# Patient Record
Sex: FEMALE | Race: WHITE | URBAN - METROPOLITAN AREA
[De-identification: names, ages, dates, MRNs, and addresses within clinical notes are randomized per-mention and may not be internally consistent; named-entity substitution may affect disease eponyms.]

---

## 2024-07-22 ENCOUNTER — INITIAL PRENATAL (OUTPATIENT)
Age: 29
End: 2024-07-22

## 2024-07-22 VITALS
HEIGHT: 63 IN | WEIGHT: 161 LBS | DIASTOLIC BLOOD PRESSURE: 60 MMHG | BODY MASS INDEX: 28.53 KG/M2 | SYSTOLIC BLOOD PRESSURE: 90 MMHG

## 2024-07-22 DIAGNOSIS — Z34.90 PREGNANCY, UNSPECIFIED GESTATIONAL AGE: Primary | ICD-10-CM

## 2024-07-22 PROCEDURE — 0500F INITIAL PRENATAL CARE VISIT: CPT | Performed by: OBSTETRICS & GYNECOLOGY

## 2024-07-22 RX ORDER — MAGNESIUM GLUCONATE 27 MG(500)
500 TABLET ORAL 2 TIMES DAILY
COMMUNITY

## 2024-07-22 NOTE — PROGRESS NOTES
Initial Obstetric Visit    Current pregnancy history:    Ralph Miranda is a  28 y.o. female Black /   White (non-) Patient's last menstrual period was 05/24/2024..    She presents for the evaluation of amenorrhea and a positive pregnancy test.    LMP history:  The date of her LMP is unsure.    Her last menstrual period was not normal and her LMP is not certain.         Ultrasound data:  She had an ultrasound performed today in the office which revealed a viable zuluaga pregnancy.  See ultrasound report for details.        TA ULTRASOUND PERFORMED A SINGLE VIABLE 6W3D WITH CINDY OF 03/14/2025 IUP IS SEEN WITH NORMAL CARDIAC RHYTHM. GESTATIONAL AGE BASED ON TODAYS ULTRASOUND. A NORMAL YOLK SAC IS SEEN. RIGHT OVARY APPEARS WITHIN NORMAL LIMITS. LEFT OVARY APPEARS WITHIN NORMAL LIMITS. NO FREE FLUID IS SEEN IN THE CDS.       Her final estimated due date is 3/14/25.  CINDY is derived from her LMP, which is concordant with ultrasound dating.      Pregnancy symptoms:  Since her LMP she has experienced nausea, dyspnea. Jolly drops helping  She denies dysuria, discharge, vaginal bleeding.      Past pregnancy history:  G1 - SAB (chemical pregnancy) in April     Relevant medical problems:  Anxiety and depression - not on meds currently    Social History:  Manager at [employer]  , Jv,  at [employer]     _ _ _ _ _ _ _ _ _ _ _ _ _ _ _ _ _ _ _ _ _ _ _ _ _ _ _ _ _ _ _ _     Substance history: Since first positive pregnancy test; negative for alcohol, tobacco and street drugs.             Exposure history:   There is/are no outdoor cat/s in the home. She has an indoor cat.  If yes, the patient was instructed to not change the cat litter.   She admits close contact with children on a regular basis.   She has had chicken pox or the vaccine in the past.   Patient denies issues with domestic violence.     Genetic Screening/Teratology Counseling: (Includes patient, baby's father, or anyone in

## 2024-07-22 NOTE — PROGRESS NOTES
Initial Prenatal Note    CC: Amenorrhea, positive pregnancy test    HPI:  Ralph Miranda is a 28 y.o.. Who presents for initial prenatal appointment. Since her LMP she has experienced nausea has treated with lemon brayden cough drop. She denies dysuria, discharge, vaginal bleeding.    Wants to do genetic screening, wants to know gender    Wants to do a water birth, does not want epidural    LMP history:  The date of her LMP 5/23/2024 full normal in June she had one on the 5th, not normal. Her last menstrual period was not normal.    Based on her LMP, her EDC is 2/27/2025 and her GA 8w4d     Ultrasound data:  She had an  ultrasound done by the ultrasound tech today which revealed a viable zuluaga pregnancy with a gestational age of 6w3d and an CINDY of 3/14/2025      TA ULTRASOUND PERFORMED A SINGLE VIABLE 6W3D WITH CINDY OF 03/14/2025 IUP IS SEEN WITH NORMAL CARDIAC RHYTHM. GESTATIONAL AGE BASED ON TODAYS ULTRASOUND. A NORMAL YOLK SAC IS SEEN. RIGHT OVARY APPEARS WITHIN NORMAL LIMITS. LEFT OVARY APPEARS WITHIN NORMAL LIMITS. NO FREE FLUID IS SEEN IN THE CDS.      Relevant past pregnancy history:  She has the following pregnancy history:  miscarriage 2 months ago    Relevant past medical history:   Last Pap: 5 years ago, normal per patient     Relevant social history:  Her occupation is:   for [employer]  Her partner's name and occupation is: Jv, manager at [employer]    1. Have you been to the ER, urgent care clinic, or hospitalized since your last visit? no    2. Have you seen or consulted any other health care providers outside of the [facility] since your last visit? no    She declines a chaperone during the gynecologic exam today.      Quin Villalpando LPN

## 2024-08-07 ENCOUNTER — ROUTINE PRENATAL (OUTPATIENT)
Age: 29
End: 2024-08-07

## 2024-08-07 VITALS — WEIGHT: 165 LBS | BODY MASS INDEX: 29.23 KG/M2 | DIASTOLIC BLOOD PRESSURE: 62 MMHG | SYSTOLIC BLOOD PRESSURE: 100 MMHG

## 2024-08-07 DIAGNOSIS — Z34.90 PREGNANCY, UNSPECIFIED GESTATIONAL AGE: Primary | ICD-10-CM

## 2024-08-07 PROCEDURE — 0502F SUBSEQUENT PRENATAL CARE: CPT | Performed by: OBSTETRICS & GYNECOLOGY

## 2024-08-07 NOTE — PROGRESS NOTES
Routine prenatal visit today.  Ultrasound images reviewed with the couple. Reassured re viable IUP c/w prior dating.  Nausea has worsened, dry heaving.  Taking brayden, frequent snacking.    Plan on initial OB labs next visit with possible NIPT/carrier screening - talking with rep about coverage.    She notes her grandmother had a stillbirth from her body attacking the baby, relays the team recommended a blood transfusion but her grandmother refused.  Pt thinks she is O positive. Reassured re the availability of rhogam now and antibody screening.

## 2024-08-07 NOTE — PROGRESS NOTES
The ultrasound images and findings from today were reviewed with the patient.  See ultrasound report and progress note for details.       Natasha Aldana MD

## 2024-08-08 LAB
BACTERIA SPEC CULT: NORMAL
SERVICE CMNT-IMP: NORMAL

## 2024-08-10 LAB
C TRACH RRNA SPEC QL NAA+PROBE: NEGATIVE
N GONORRHOEA RRNA SPEC QL NAA+PROBE: NEGATIVE
SPECIMEN SOURCE: NORMAL
T VAGINALIS RRNA SPEC QL NAA+PROBE: NEGATIVE

## 2024-08-28 ENCOUNTER — LAB (OUTPATIENT)
Age: 29
End: 2024-08-28

## 2024-08-28 DIAGNOSIS — Z34.90 PREGNANCY, UNSPECIFIED GESTATIONAL AGE: Primary | ICD-10-CM

## 2024-08-28 LAB
ABO + RH BLD: NORMAL
BLOOD BANK CMNT PATIENT-IMP: NORMAL
BLOOD GROUP ANTIBODIES SERPL: NORMAL
ERYTHROCYTE [DISTWIDTH] IN BLOOD BY AUTOMATED COUNT: 12.8 % (ref 11.5–14.5)
FERRITIN SERPL-MCNC: 62 NG/ML (ref 8–252)
HBV SURFACE AG SER QL: <0.1 INDEX
HBV SURFACE AG SER QL: NEGATIVE
HCT VFR BLD AUTO: 36.8 % (ref 35–47)
HCV AB SER IA-ACNC: 0.05 INDEX
HCV AB SERPL QL IA: NONREACTIVE
HEP B, EXTERNAL RESULT: NEGATIVE
HGB BLD-MCNC: 12 G/DL (ref 11.5–16)
HIV 1+2 AB+HIV1 P24 AG SERPL QL IA: NONREACTIVE
HIV 1/2 RESULT COMMENT: NORMAL
HIV, EXTERNAL RESULT: NONREACTIVE
MCH RBC QN AUTO: 30.8 PG (ref 26–34)
MCHC RBC AUTO-ENTMCNC: 32.6 G/DL (ref 30–36.5)
MCV RBC AUTO: 94.6 FL (ref 80–99)
NRBC # BLD: 0 K/UL (ref 0–0.01)
NRBC BLD-RTO: 0 PER 100 WBC
PLATELET # BLD AUTO: 202 K/UL (ref 150–400)
PMV BLD AUTO: 11.5 FL (ref 8.9–12.9)
RBC # BLD AUTO: 3.89 M/UL (ref 3.8–5.2)
RUBELLA TITER, EXTERNAL RESULT: NORMAL
RUBV IGG SERPL IA-ACNC: NORMAL IU/ML
SPECIMEN EXP DATE BLD: NORMAL
T. PALLIDUM (SYPHILIS) ANTIBODY, EXTERNAL RESULT: NONREACTIVE
WBC # BLD AUTO: 5.2 K/UL (ref 3.6–11)

## 2024-08-29 LAB
T PALLIDUM AB SER QL IA: NON REACTIVE
VZV IGG SER IA-ACNC: <135 INDEX

## 2024-09-03 LAB
HGB A MFR BLD: 96.4 % (ref 96.4–98.8)
HGB A2 MFR BLD COLUMN CHROM: 2.8 % (ref 1.8–3.2)
HGB F MFR BLD: 0.8 % (ref 0–2)
HGB FRACT BLD-IMP: NORMAL
HGB S MFR BLD: 0 %

## 2024-09-05 ENCOUNTER — ROUTINE PRENATAL (OUTPATIENT)
Age: 29
End: 2024-09-05

## 2024-09-05 VITALS
BODY MASS INDEX: 29.59 KG/M2 | TEMPERATURE: 97.9 F | SYSTOLIC BLOOD PRESSURE: 129 MMHG | HEIGHT: 63 IN | OXYGEN SATURATION: 98 % | WEIGHT: 167 LBS | DIASTOLIC BLOOD PRESSURE: 79 MMHG | RESPIRATION RATE: 12 BRPM | HEART RATE: 86 BPM

## 2024-09-05 DIAGNOSIS — Z34.90 PREGNANCY, UNSPECIFIED GESTATIONAL AGE: Primary | ICD-10-CM

## 2024-09-05 PROCEDURE — 0502F SUBSEQUENT PRENATAL CARE: CPT | Performed by: OBSTETRICS & GYNECOLOGY

## 2024-09-05 NOTE — PROGRESS NOTES
Patient here for return OB visit at 12w6d. She reports no concerns.    She denies vaginal bleeding, loss of fluid, abnormal vaginal discharge, UTI symptoms, cramping, or contractions.       /79 (Site: Right Upper Arm, Position: Sitting)   Pulse 86   Temp 97.9 °F (36.6 °C) (Oral)   Resp 12   Ht 1.6 m (5' 3\")   Wt 75.8 kg (167 lb)   LMP 05/24/2024   SpO2 98%   BMI 29.58 kg/m²            Patient Active Problem List    Diagnosis Date Noted    Pregnancy 07/22/2024     Primary Provider: Katya          EDC by 6 wk     Pregnancy problems:  Anxiety and depression:  not on meds currently, stable    IOB labs:  O pos, normal, VZV NI--> PP vacc,  GC/Chl neg, urine cult neg  Genetic Screening: NIPT ___ and carrier screening ____  Anatomy:  Flu:  TDAP:  Third Tri Labs:  GBS:    Pain mgmt. in labor:  Feeding:  Circ:  Social: patient is a  at [employer] and  Jv is a  at [employer]        Depression with anxiety 12/23/2014       Return in about 4 weeks (around 10/3/2024).    Melvi Gordon MD

## 2024-09-05 NOTE — PROGRESS NOTES
Ralph Miranda is 12w6d   Doing well  Denies LOF, bleeding/spotting, headache, blurred vision, edema, or RUQ pain.   Occasional discomfort and cramping when laying in bed. Occasional gums and nose bleeding.

## 2024-09-06 LAB
Lab: NEGATIVE
Lab: NORMAL
NTRA CYSTIC FIBROSIS: NEGATIVE
NTRA DUCHENNE/BECKER MUSCULAR DYSTROPHY: NEGATIVE
NTRA FRAGILE X SYNDROME: NEGATIVE
NTRA SPINAL MUSCULAR ATROPHY: NEGATIVE

## 2024-09-16 ENCOUNTER — TELEPHONE (OUTPATIENT)
Age: 29
End: 2024-09-16

## 2024-09-16 DIAGNOSIS — Z34.90 PREGNANCY, UNSPECIFIED GESTATIONAL AGE: Primary | ICD-10-CM

## 2024-10-01 ENCOUNTER — ROUTINE PRENATAL (OUTPATIENT)
Age: 29
End: 2024-10-01

## 2024-10-01 VITALS
HEIGHT: 63 IN | HEART RATE: 83 BPM | TEMPERATURE: 99 F | SYSTOLIC BLOOD PRESSURE: 117 MMHG | OXYGEN SATURATION: 99 % | WEIGHT: 169 LBS | DIASTOLIC BLOOD PRESSURE: 76 MMHG | BODY MASS INDEX: 29.95 KG/M2 | RESPIRATION RATE: 18 BRPM

## 2024-10-01 DIAGNOSIS — Z34.90 PREGNANCY, UNSPECIFIED GESTATIONAL AGE: Primary | ICD-10-CM

## 2024-10-01 PROCEDURE — 0502F SUBSEQUENT PRENATAL CARE: CPT | Performed by: OBSTETRICS & GYNECOLOGY

## 2024-10-01 NOTE — PROGRESS NOTES
Routine prenatal visit today.  Leola flutters earlier this week!  Declines MSAFP today.  Fetal scan at her next appt.

## 2024-10-29 ENCOUNTER — ROUTINE PRENATAL (OUTPATIENT)
Age: 29
End: 2024-10-29

## 2024-10-29 ENCOUNTER — ROUTINE PRENATAL (OUTPATIENT)
Age: 29
End: 2024-10-29
Payer: COMMERCIAL

## 2024-10-29 VITALS — SYSTOLIC BLOOD PRESSURE: 124 MMHG | DIASTOLIC BLOOD PRESSURE: 74 MMHG | HEART RATE: 90 BPM

## 2024-10-29 VITALS
DIASTOLIC BLOOD PRESSURE: 76 MMHG | SYSTOLIC BLOOD PRESSURE: 115 MMHG | OXYGEN SATURATION: 97 % | RESPIRATION RATE: 16 BRPM | BODY MASS INDEX: 30.65 KG/M2 | WEIGHT: 173 LBS | HEIGHT: 63 IN | TEMPERATURE: 98.9 F | HEART RATE: 81 BPM

## 2024-10-29 DIAGNOSIS — Z82.79 FAMILY HISTORY OF CONGENITAL ANOMALIES: Primary | ICD-10-CM

## 2024-10-29 DIAGNOSIS — Z34.90 PREGNANCY, UNSPECIFIED GESTATIONAL AGE: Primary | ICD-10-CM

## 2024-10-29 DIAGNOSIS — Z3A.20 20 WEEKS GESTATION OF PREGNANCY: ICD-10-CM

## 2024-10-29 PROCEDURE — 0502F SUBSEQUENT PRENATAL CARE: CPT | Performed by: OBSTETRICS & GYNECOLOGY

## 2024-10-29 PROCEDURE — 99202 OFFICE O/P NEW SF 15 MIN: CPT | Performed by: OBSTETRICS & GYNECOLOGY

## 2024-10-29 PROCEDURE — 76805 OB US >/= 14 WKS SNGL FETUS: CPT | Performed by: OBSTETRICS & GYNECOLOGY

## 2024-10-29 NOTE — PROCEDURES
PATIENT: MEHRAN LUZ   -  : 1995   -  DOS:10/29/2024   -  INTERPRETING PROVIDER:Rick Robin,   Indication  ========     screening. FOB with history of possible ureterocele requiring surgery in his 20s.    Method  ======    Transabdominal ultrasound examination. View: Good view    Dating  ======    LMP on: 2024  GA by LMP 22 w + 4 d  CINDY by LMP: 2025  Previous Ultrasound on: 2024  Type of prior assessment: GA  GA at prior assessment date 6 w + 3 d  GA by previous U/S 20 w + 4 d  CINDY by previous Ultrasound: 3/14/2025  Ultrasound examination on: 10/29/2024  GA by U/S based upon: AC, BPD, Femur, HC  GA by U/S 21 w + 1 d  CINDY by U/S: 3/10/2025  Assigned: based on ultrasound (GA), selected on 10/29/2024  Assigned GA 20 w + 4 d  Assigned CINDY: 3/14/2025    Fetal Growth Overview  =================    Exam date        GA              BPD (mm)          HC (mm)              AC (mm)               FL (mm)             HL (mm)            EFW (g)  10/29/2024        20w 4d        49.1     61%        188.2    67%        164.9     75%        34.9    57%        31.1     40%        411    80%    Fetal Biometry  ============    Standard  BPD 49.1 mm 20w 6d 61% Hadlock  OFD 67.8 mm 22w 5d 98% Margareth  .2 mm 21w 1d 67% Hadlock  Cerebellum tr 22.3 mm  88% Verburg  Nuchal fold 2.5 mm  .9 mm 21w 4d 75% Hadlock  Femur 34.9 mm 21w 0d 57% Hadlock  Humerus 31.1 mm 20w 2d 40% Margareth   g 21w 1d 80% Hadlock  EFW (lb) 0 lb  EFW (oz) 14 oz  EFW by: Hadlock (BPD-HC-AC-FL)  Extended   5.0 mm  CM 6.9 mm  92% Nicolaides  Nasal bone 7.4 mm  Head / Face / Neck  Nasal bone: present  Other Structures   bpm    General Evaluation  ==============    Cardiac activity present.  bpm. Fetal movements: visualized. Presentation: Cephalic  Placenta: Placental site: anterior, appropriate distance from the internal os. Placental edge-to-cervical os distance 4.5 cm  Umbilical cord: Cord vessels: 3

## 2024-10-29 NOTE — PROGRESS NOTES
Doing well  Starting to feel baby move  Denies lof and vb  Declines flu shot  FAS with MFM this morning

## 2024-10-29 NOTE — PROGRESS NOTES
Routine prenatal visit today.  Feeling fetal flutter movements now!  Was able to d/c unisom, nausea is improving.   Declines flu shot today.   MFM fetal scan earlier today - report normal. They recommended growth scan in 8 weeks.

## 2024-12-04 ENCOUNTER — ROUTINE PRENATAL (OUTPATIENT)
Age: 29
End: 2024-12-04

## 2024-12-04 VITALS
RESPIRATION RATE: 20 BRPM | OXYGEN SATURATION: 98 % | HEIGHT: 63 IN | TEMPERATURE: 98 F | HEART RATE: 94 BPM | DIASTOLIC BLOOD PRESSURE: 67 MMHG | BODY MASS INDEX: 32.25 KG/M2 | WEIGHT: 182 LBS | SYSTOLIC BLOOD PRESSURE: 103 MMHG

## 2024-12-04 DIAGNOSIS — Z34.90 PREGNANCY, UNSPECIFIED GESTATIONAL AGE: Primary | ICD-10-CM

## 2024-12-04 PROCEDURE — 0502F SUBSEQUENT PRENATAL CARE: CPT | Performed by: OBSTETRICS & GYNECOLOGY

## 2024-12-04 NOTE — PROGRESS NOTES
Routine prenatal visit today.  Normal fetal movements.  Feeling more constipated - discussed.   Glucola with third tri labs, tdap, and growth US at next visit.

## 2024-12-04 NOTE — PROGRESS NOTES
Doing well but relays that she has been stressed as she lost her job. She started a part time at [employer], denies lof, vb and contractions

## 2024-12-20 DIAGNOSIS — Z34.90 PREGNANCY, UNSPECIFIED GESTATIONAL AGE: Primary | ICD-10-CM

## 2024-12-24 ENCOUNTER — ROUTINE PRENATAL (OUTPATIENT)
Age: 29
End: 2024-12-24

## 2024-12-24 VITALS
SYSTOLIC BLOOD PRESSURE: 100 MMHG | HEIGHT: 63 IN | WEIGHT: 187 LBS | TEMPERATURE: 98.6 F | DIASTOLIC BLOOD PRESSURE: 66 MMHG | RESPIRATION RATE: 16 BRPM | OXYGEN SATURATION: 97 % | BODY MASS INDEX: 33.13 KG/M2 | HEART RATE: 92 BPM

## 2024-12-24 DIAGNOSIS — Z34.90 PREGNANCY, UNSPECIFIED GESTATIONAL AGE: Primary | ICD-10-CM

## 2024-12-24 LAB
BLOOD BANK CMNT PATIENT-IMP: NORMAL
BLOOD GROUP ANTIBODIES SERPL: NORMAL
ERYTHROCYTE [DISTWIDTH] IN BLOOD BY AUTOMATED COUNT: 13.2 % (ref 11.5–14.5)
FERRITIN SERPL-MCNC: 24 NG/ML (ref 8–252)
GLUCOSE 1H P 100 G GLC PO SERPL-MCNC: 146 MG/DL (ref 65–140)
HCT VFR BLD AUTO: 35.5 % (ref 35–47)
HGB BLD-MCNC: 11.8 G/DL (ref 11.5–16)
MCH RBC QN AUTO: 31.3 PG (ref 26–34)
MCHC RBC AUTO-ENTMCNC: 33.2 G/DL (ref 30–36.5)
MCV RBC AUTO: 94.2 FL (ref 80–99)
NRBC # BLD: 0 K/UL (ref 0–0.01)
NRBC BLD-RTO: 0 PER 100 WBC
PLATELET # BLD AUTO: 196 K/UL (ref 150–400)
PMV BLD AUTO: 11.5 FL (ref 8.9–12.9)
RBC # BLD AUTO: 3.77 M/UL (ref 3.8–5.2)
WBC # BLD AUTO: 8.8 K/UL (ref 3.6–11)

## 2024-12-24 PROCEDURE — 0502F SUBSEQUENT PRENATAL CARE: CPT | Performed by: OBSTETRICS & GYNECOLOGY

## 2024-12-24 NOTE — PROGRESS NOTES
Routine prenatal visit today.  Normal fetal movements.  Fetal growth and DOROTHY normal today. Right lateral ventricle mildly dilated 1.1cm, recommend FU with MFM in 2-3 weeks, but reassured at this level the recommendation would be surveillance.   Glucola and third tri labs today.  Opts for tdap at next visit.

## 2024-12-24 NOTE — PROGRESS NOTES
Doing well, has had some bhc  Some heart burn, treating with tums when needed    Active FM  Declines lof and vb

## 2024-12-26 LAB — T PALLIDUM AB SER QL IA: NON REACTIVE

## 2024-12-30 ENCOUNTER — LAB (OUTPATIENT)
Age: 29
End: 2024-12-30

## 2024-12-30 DIAGNOSIS — Z34.90 PREGNANCY, UNSPECIFIED GESTATIONAL AGE: Primary | ICD-10-CM

## 2024-12-30 LAB
GESTATIONAL 3HR GTT: ABNORMAL
GLUCOSE 1H P 100 G GLC PO SERPL-MCNC: 147 MG/DL (ref 65–180)
GLUCOSE 2 HOUR: 119 MG/DL (ref 65–155)
GLUCOSE P FAST SERPL-MCNC: 97 MG/DL (ref 65–95)
GLUCOSE, 3 HOUR: 113 MG/DL (ref 65–140)

## 2025-01-10 ENCOUNTER — ROUTINE PRENATAL (OUTPATIENT)
Age: 30
End: 2025-01-10
Payer: MEDICAID

## 2025-01-10 VITALS — DIASTOLIC BLOOD PRESSURE: 69 MMHG | HEART RATE: 82 BPM | OXYGEN SATURATION: 96 % | SYSTOLIC BLOOD PRESSURE: 111 MMHG

## 2025-01-10 DIAGNOSIS — Z3A.31 31 WEEKS GESTATION OF PREGNANCY: Primary | ICD-10-CM

## 2025-01-10 PROCEDURE — 99203 OFFICE O/P NEW LOW 30 MIN: CPT | Performed by: OBSTETRICS & GYNECOLOGY

## 2025-01-10 RX ORDER — MULTIVIT WITH MINERALS/LUTEIN
1000 TABLET ORAL DAILY
COMMUNITY

## 2025-01-14 ENCOUNTER — HOSPITAL ENCOUNTER (OUTPATIENT)
Facility: HOSPITAL | Age: 30
Setting detail: OBSERVATION
Discharge: HOME | End: 2025-01-14
Attending: OBSTETRICS & GYNECOLOGY | Admitting: OBSTETRICS & GYNECOLOGY
Payer: MEDICAID

## 2025-01-14 ENCOUNTER — HOSPITAL ENCOUNTER (EMERGENCY)
Facility: HOSPITAL | Age: 30
Discharge: HOME | End: 2025-01-14
Attending: EMERGENCY MEDICINE
Payer: MEDICAID

## 2025-01-14 ENCOUNTER — ROUTINE PRENATAL (OUTPATIENT)
Age: 30
End: 2025-01-14
Payer: MEDICAID

## 2025-01-14 ENCOUNTER — APPOINTMENT (OUTPATIENT)
Facility: HOSPITAL | Age: 30
End: 2025-01-14
Payer: MEDICAID

## 2025-01-14 VITALS
TEMPERATURE: 98.3 F | SYSTOLIC BLOOD PRESSURE: 105 MMHG | DIASTOLIC BLOOD PRESSURE: 66 MMHG | WEIGHT: 194 LBS | HEART RATE: 94 BPM | HEIGHT: 63 IN | RESPIRATION RATE: 20 BRPM | BODY MASS INDEX: 34.38 KG/M2 | OXYGEN SATURATION: 97 %

## 2025-01-14 VITALS
OXYGEN SATURATION: 98 % | HEIGHT: 63 IN | DIASTOLIC BLOOD PRESSURE: 65 MMHG | TEMPERATURE: 98 F | SYSTOLIC BLOOD PRESSURE: 122 MMHG | HEART RATE: 80 BPM | WEIGHT: 194 LBS | BODY MASS INDEX: 34.38 KG/M2 | RESPIRATION RATE: 16 BRPM

## 2025-01-14 VITALS
BODY MASS INDEX: 34.53 KG/M2 | TEMPERATURE: 97.3 F | DIASTOLIC BLOOD PRESSURE: 76 MMHG | RESPIRATION RATE: 28 BRPM | SYSTOLIC BLOOD PRESSURE: 123 MMHG | HEIGHT: 63 IN | HEART RATE: 87 BPM | WEIGHT: 194.89 LBS | OXYGEN SATURATION: 96 %

## 2025-01-14 DIAGNOSIS — R10.9 ABDOMINAL PAIN, UNSPECIFIED ABDOMINAL LOCATION: ICD-10-CM

## 2025-01-14 DIAGNOSIS — R10.13 ABDOMINAL PAIN, EPIGASTRIC: Primary | ICD-10-CM

## 2025-01-14 DIAGNOSIS — Z34.90 PREGNANCY, UNSPECIFIED GESTATIONAL AGE: Primary | ICD-10-CM

## 2025-01-14 LAB
ALBUMIN SERPL-MCNC: 2.7 G/DL (ref 3.5–5)
ALBUMIN SERPL-MCNC: 2.8 G/DL (ref 3.5–5)
ALBUMIN/GLOB SERPL: 0.7 (ref 1.1–2.2)
ALBUMIN/GLOB SERPL: 0.7 (ref 1.1–2.2)
ALP SERPL-CCNC: 92 U/L (ref 45–117)
ALP SERPL-CCNC: 92 U/L (ref 45–117)
ALT SERPL-CCNC: 32 U/L (ref 12–78)
ALT SERPL-CCNC: 36 U/L (ref 12–78)
ANION GAP SERPL CALC-SCNC: 4 MMOL/L (ref 2–12)
ANION GAP SERPL CALC-SCNC: 6 MMOL/L (ref 2–12)
APPEARANCE UR: ABNORMAL
AST SERPL-CCNC: 15 U/L (ref 15–37)
AST SERPL-CCNC: ABNORMAL U/L (ref 15–37)
BACTERIA URNS QL MICRO: ABNORMAL /HPF
BASOPHILS # BLD: 0.01 K/UL (ref 0–0.1)
BASOPHILS # BLD: 0.02 K/UL (ref 0–0.1)
BASOPHILS NFR BLD: 0.1 % (ref 0–1)
BASOPHILS NFR BLD: 0.2 % (ref 0–1)
BILIRUB SERPL-MCNC: 0.2 MG/DL (ref 0.2–1)
BILIRUB SERPL-MCNC: 0.4 MG/DL (ref 0.2–1)
BILIRUB UR QL: NEGATIVE
BUN SERPL-MCNC: 6 MG/DL (ref 6–20)
BUN SERPL-MCNC: 7 MG/DL (ref 6–20)
BUN/CREAT SERPL: 11 (ref 12–20)
BUN/CREAT SERPL: 13 (ref 12–20)
CALCIUM SERPL-MCNC: 9.3 MG/DL (ref 8.5–10.1)
CALCIUM SERPL-MCNC: 9.3 MG/DL (ref 8.5–10.1)
CHLORIDE SERPL-SCNC: 108 MMOL/L (ref 97–108)
CHLORIDE SERPL-SCNC: 109 MMOL/L (ref 97–108)
CO2 SERPL-SCNC: 21 MMOL/L (ref 21–32)
CO2 SERPL-SCNC: 23 MMOL/L (ref 21–32)
COLOR UR: ABNORMAL
COMMENT:: NORMAL
CREAT SERPL-MCNC: 0.45 MG/DL (ref 0.55–1.02)
CREAT SERPL-MCNC: 0.61 MG/DL (ref 0.55–1.02)
DIFFERENTIAL METHOD BLD: ABNORMAL
DIFFERENTIAL METHOD BLD: ABNORMAL
EOSINOPHIL # BLD: 0.05 K/UL (ref 0–0.4)
EOSINOPHIL # BLD: 0.07 K/UL (ref 0–0.4)
EOSINOPHIL NFR BLD: 0.5 % (ref 0–7)
EOSINOPHIL NFR BLD: 0.7 % (ref 0–7)
EPITH CASTS URNS QL MICRO: ABNORMAL /LPF
ERYTHROCYTE [DISTWIDTH] IN BLOOD BY AUTOMATED COUNT: 13.1 % (ref 11.5–14.5)
ERYTHROCYTE [DISTWIDTH] IN BLOOD BY AUTOMATED COUNT: 13.2 % (ref 11.5–14.5)
GLOBULIN SER CALC-MCNC: 3.9 G/DL (ref 2–4)
GLOBULIN SER CALC-MCNC: 3.9 G/DL (ref 2–4)
GLUCOSE SERPL-MCNC: 120 MG/DL (ref 65–100)
GLUCOSE SERPL-MCNC: 121 MG/DL (ref 65–100)
GLUCOSE UR STRIP.AUTO-MCNC: 250 MG/DL
HCT VFR BLD AUTO: 32.9 % (ref 35–47)
HCT VFR BLD AUTO: 33.5 % (ref 35–47)
HGB BLD-MCNC: 11.2 G/DL (ref 11.5–16)
HGB BLD-MCNC: 11.4 G/DL (ref 11.5–16)
HGB UR QL STRIP: NEGATIVE
IMM GRANULOCYTES # BLD AUTO: 0.07 K/UL (ref 0–0.04)
IMM GRANULOCYTES # BLD AUTO: 0.08 K/UL (ref 0–0.04)
IMM GRANULOCYTES NFR BLD AUTO: 0.7 % (ref 0–0.5)
IMM GRANULOCYTES NFR BLD AUTO: 0.9 % (ref 0–0.5)
KETONES UR QL STRIP.AUTO: ABNORMAL MG/DL
LEUKOCYTE ESTERASE UR QL STRIP.AUTO: NEGATIVE
LIPASE SERPL-CCNC: 11 U/L (ref 13–75)
LIPASE SERPL-CCNC: 15 U/L (ref 13–75)
LYMPHOCYTES # BLD: 1.86 K/UL (ref 0.8–3.5)
LYMPHOCYTES # BLD: 2.41 K/UL (ref 0.8–3.5)
LYMPHOCYTES NFR BLD: 20.1 % (ref 12–49)
LYMPHOCYTES NFR BLD: 25.1 % (ref 12–49)
MCH RBC QN AUTO: 30.9 PG (ref 26–34)
MCH RBC QN AUTO: 31.1 PG (ref 26–34)
MCHC RBC AUTO-ENTMCNC: 34 G/DL (ref 30–36.5)
MCHC RBC AUTO-ENTMCNC: 34 G/DL (ref 30–36.5)
MCV RBC AUTO: 90.9 FL (ref 80–99)
MCV RBC AUTO: 91.3 FL (ref 80–99)
MONOCYTES # BLD: 0.87 K/UL (ref 0–1)
MONOCYTES # BLD: 0.89 K/UL (ref 0–1)
MONOCYTES NFR BLD: 9.3 % (ref 5–13)
MONOCYTES NFR BLD: 9.4 % (ref 5–13)
NEUTS SEG # BLD: 6.14 K/UL (ref 1.8–8)
NEUTS SEG # BLD: 6.38 K/UL (ref 1.8–8)
NEUTS SEG NFR BLD: 64 % (ref 32–75)
NEUTS SEG NFR BLD: 69 % (ref 32–75)
NITRITE UR QL STRIP.AUTO: NEGATIVE
NRBC # BLD: 0 K/UL (ref 0–0.01)
NRBC # BLD: 0 K/UL (ref 0–0.01)
NRBC BLD-RTO: 0 PER 100 WBC
NRBC BLD-RTO: 0 PER 100 WBC
PH UR STRIP: 7 (ref 5–8)
PLATELET # BLD AUTO: 187 K/UL (ref 150–400)
PLATELET # BLD AUTO: 203 K/UL (ref 150–400)
PMV BLD AUTO: 11.2 FL (ref 8.9–12.9)
PMV BLD AUTO: 11.3 FL (ref 8.9–12.9)
POTASSIUM SERPL-SCNC: 3.5 MMOL/L (ref 3.5–5.1)
POTASSIUM SERPL-SCNC: ABNORMAL MMOL/L (ref 3.5–5.1)
PROT SERPL-MCNC: 6.6 G/DL (ref 6.4–8.2)
PROT SERPL-MCNC: 6.7 G/DL (ref 6.4–8.2)
PROT UR STRIP-MCNC: ABNORMAL MG/DL
RBC # BLD AUTO: 3.62 M/UL (ref 3.8–5.2)
RBC # BLD AUTO: 3.67 M/UL (ref 3.8–5.2)
RBC #/AREA URNS HPF: ABNORMAL /HPF (ref 0–5)
SODIUM SERPL-SCNC: 135 MMOL/L (ref 136–145)
SODIUM SERPL-SCNC: 136 MMOL/L (ref 136–145)
SP GR UR REFRACTOMETRY: 1.02 (ref 1–1.03)
SPECIMEN HOLD: NORMAL
UROBILINOGEN UR QL STRIP.AUTO: 1 EU/DL (ref 0.2–1)
WBC # BLD AUTO: 9.3 K/UL (ref 3.6–11)
WBC # BLD AUTO: 9.6 K/UL (ref 3.6–11)
WBC URNS QL MICRO: ABNORMAL /HPF (ref 0–4)

## 2025-01-14 PROCEDURE — 81001 URINALYSIS AUTO W/SCOPE: CPT

## 2025-01-14 PROCEDURE — 99222 1ST HOSP IP/OBS MODERATE 55: CPT | Performed by: OBSTETRICS & GYNECOLOGY

## 2025-01-14 PROCEDURE — 85025 COMPLETE CBC W/AUTO DIFF WBC: CPT

## 2025-01-14 PROCEDURE — G0378 HOSPITAL OBSERVATION PER HR: HCPCS

## 2025-01-14 PROCEDURE — 80053 COMPREHEN METABOLIC PANEL: CPT

## 2025-01-14 PROCEDURE — 96375 TX/PRO/DX INJ NEW DRUG ADDON: CPT

## 2025-01-14 PROCEDURE — 59025 FETAL NON-STRESS TEST: CPT | Performed by: OBSTETRICS & GYNECOLOGY

## 2025-01-14 PROCEDURE — 76705 ECHO EXAM OF ABDOMEN: CPT

## 2025-01-14 PROCEDURE — G0379 DIRECT REFER HOSPITAL OBSERV: HCPCS

## 2025-01-14 PROCEDURE — 6360000002 HC RX W HCPCS: Performed by: OBSTETRICS & GYNECOLOGY

## 2025-01-14 PROCEDURE — 2500000003 HC RX 250 WO HCPCS: Performed by: OBSTETRICS & GYNECOLOGY

## 2025-01-14 PROCEDURE — 2580000003 HC RX 258: Performed by: OBSTETRICS & GYNECOLOGY

## 2025-01-14 PROCEDURE — 99284 EMERGENCY DEPT VISIT MOD MDM: CPT

## 2025-01-14 PROCEDURE — 83690 ASSAY OF LIPASE: CPT

## 2025-01-14 PROCEDURE — 76700 US EXAM ABDOM COMPLETE: CPT

## 2025-01-14 PROCEDURE — 96374 THER/PROPH/DIAG INJ IV PUSH: CPT

## 2025-01-14 PROCEDURE — 6370000000 HC RX 637 (ALT 250 FOR IP): Performed by: OBSTETRICS & GYNECOLOGY

## 2025-01-14 PROCEDURE — 36415 COLL VENOUS BLD VENIPUNCTURE: CPT

## 2025-01-14 RX ORDER — MORPHINE SULFATE 4 MG/ML
4 INJECTION, SOLUTION INTRAMUSCULAR; INTRAVENOUS EVERY 4 HOURS PRN
Status: DISCONTINUED | OUTPATIENT
Start: 2025-01-14 | End: 2025-01-14 | Stop reason: HOSPADM

## 2025-01-14 RX ORDER — ONDANSETRON 2 MG/ML
4 INJECTION INTRAMUSCULAR; INTRAVENOUS EVERY 6 HOURS PRN
Status: DISCONTINUED | OUTPATIENT
Start: 2025-01-14 | End: 2025-01-14 | Stop reason: HOSPADM

## 2025-01-14 RX ORDER — ACETAMINOPHEN 325 MG/1
650 TABLET ORAL EVERY 4 HOURS PRN
Status: DISCONTINUED | OUTPATIENT
Start: 2025-01-14 | End: 2025-01-14 | Stop reason: HOSPADM

## 2025-01-14 RX ADMIN — ACETAMINOPHEN 650 MG: 325 TABLET ORAL at 17:14

## 2025-01-14 RX ADMIN — FAMOTIDINE 20 MG: 10 INJECTION, SOLUTION INTRAVENOUS at 17:15

## 2025-01-14 RX ADMIN — ONDANSETRON 4 MG: 2 INJECTION INTRAMUSCULAR; INTRAVENOUS at 17:19

## 2025-01-14 RX ADMIN — MORPHINE SULFATE 4 MG: 4 INJECTION INTRAVENOUS at 17:21

## 2025-01-14 ASSESSMENT — PATIENT HEALTH QUESTIONNAIRE - PHQ9
6. FEELING BAD ABOUT YOURSELF - OR THAT YOU ARE A FAILURE OR HAVE LET YOURSELF OR YOUR FAMILY DOWN: NOT AT ALL
SUM OF ALL RESPONSES TO PHQ QUESTIONS 1-9: 0
7. TROUBLE CONCENTRATING ON THINGS, SUCH AS READING THE NEWSPAPER OR WATCHING TELEVISION: NOT AT ALL
10. IF YOU CHECKED OFF ANY PROBLEMS, HOW DIFFICULT HAVE THESE PROBLEMS MADE IT FOR YOU TO DO YOUR WORK, TAKE CARE OF THINGS AT HOME, OR GET ALONG WITH OTHER PEOPLE: NOT DIFFICULT AT ALL
2. FEELING DOWN, DEPRESSED OR HOPELESS: NOT AT ALL
1. LITTLE INTEREST OR PLEASURE IN DOING THINGS: NOT AT ALL
4. FEELING TIRED OR HAVING LITTLE ENERGY: NOT AT ALL
SUM OF ALL RESPONSES TO PHQ QUESTIONS 1-9: 0
9. THOUGHTS THAT YOU WOULD BE BETTER OFF DEAD, OR OF HURTING YOURSELF: NOT AT ALL
5. POOR APPETITE OR OVEREATING: NOT AT ALL
3. TROUBLE FALLING OR STAYING ASLEEP: NOT AT ALL
SUM OF ALL RESPONSES TO PHQ9 QUESTIONS 1 & 2: 0
SUM OF ALL RESPONSES TO PHQ QUESTIONS 1-9: 0
8. MOVING OR SPEAKING SO SLOWLY THAT OTHER PEOPLE COULD HAVE NOTICED. OR THE OPPOSITE, BEING SO FIGETY OR RESTLESS THAT YOU HAVE BEEN MOVING AROUND A LOT MORE THAN USUAL: NOT AT ALL
SUM OF ALL RESPONSES TO PHQ QUESTIONS 1-9: 0

## 2025-01-14 ASSESSMENT — PAIN - FUNCTIONAL ASSESSMENT
PAIN_FUNCTIONAL_ASSESSMENT: 0-10
PAIN_FUNCTIONAL_ASSESSMENT: ACTIVITIES ARE NOT PREVENTED

## 2025-01-14 ASSESSMENT — PAIN DESCRIPTION - ORIENTATION: ORIENTATION: RIGHT;LEFT;UPPER

## 2025-01-14 ASSESSMENT — PAIN DESCRIPTION - FREQUENCY: FREQUENCY: CONTINUOUS

## 2025-01-14 ASSESSMENT — PAIN SCALES - GENERAL: PAINLEVEL_OUTOF10: 10

## 2025-01-14 ASSESSMENT — PAIN DESCRIPTION - LOCATION: LOCATION: ABDOMEN

## 2025-01-14 ASSESSMENT — PAIN DESCRIPTION - DESCRIPTORS: DESCRIPTORS: SHARP

## 2025-01-14 ASSESSMENT — PAIN DESCRIPTION - ONSET: ONSET: ON-GOING

## 2025-01-14 ASSESSMENT — PAIN DESCRIPTION - PAIN TYPE: TYPE: ACUTE PAIN

## 2025-01-14 NOTE — PROGRESS NOTES
1640: Patient here from Dr Aldana's office. She was seen in the office for pain in her upper abdomen (Bilaterally). Reports some nausea without vomiting over the last 2 days. She states the pain is sharp in nature.     1800: Patient resting. She states the pain has let up some.     1930: Bedside and Verbal shift change report given to ROSENDO Prieto RN (oncoming nurse) by NATHAN Duffy RN (offgoing nurse). Report included the following information ED SBAR, MAR, and Recent Results.

## 2025-01-14 NOTE — H&P
Department of Obstetrics and Gynecology   Obstetrics History and Physical  H&P Admission Assessment Note        HISTORY OF PRESENT ILLNESS:      The patient is a 29 y.o. female at 31w4d who presents from the office for evaluation of severe abdominal pain present since last night.   Pain last night was present for 5 hours, then again present today.  Located in the upper abdomen and in the lower abdomen. Described as sharp and stabbing.    No vaginal bleeding, no LOF.   Feels very active FM.        PRENATAL CARE:  Patient Active Problem List    Diagnosis Date Noted    Abdominal pain 2025    Pregnancy 2024     Primary Provider: Katya      **tdap at 30 weeks  G1   EDC by 6 wk     Pregnancy problems:  Anxiety and depression:  not on meds currently, stable  L5 pinched disc: chronic pain in pregnancy  Right lateral ventricle mildly dilated 1.1cm at 28 weeks: rec MFM FU ____    IOB labs:  O pos, normal, VZV NI--> PP vacc,  GC/Chl neg, urine cult neg  Genetic Screening: NIPT low risk BOY!! and carrier screening 4 panel neg  Anatomy: normal anatomy with MFM, anterior placenta  Flu: declines  TDAP: plan at 30 wks ___  RSV: vaccine rx at 32 week ___  Third Tri Labs: 1 hr -->  3 hr GTT - only 1 high value, 11.8/35.5  GBS:    Pain mgmt. in labor: low intervention as possible, will try no epidural  Feeding:  Circ: desires  Social: patient is a  at [employer] and  Jv is a  at [employer]  Birth preferences: desires labor in water, delayed cord clamping when cord stops pulsating, low intervention        Depression with anxiety 2014        PAST OB HISTORY:  OB History          1    Para        Term                AB        Living             SAB        IAB        Ectopic        Molar        Multiple        Live Births                    Past Medical History:    No past medical history on file.  Past Surgical History:    No past surgical history on

## 2025-01-14 NOTE — PROGRESS NOTES
Relays that she had been having bad pain that is making her want to throw up & cry. Relays she feels like baby is in her ribs  Pain is shooting and stabbing    Active FM, more than normal  Denies lof & vb    Had had increase in urination

## 2025-01-14 NOTE — PROGRESS NOTES
OB Problem Visit    Ralph Miranda is a 29 y.o.  presenting for an OB problem visit. Her main concern today is abdominal pain.     She reports a shooting and stabbing pain in the epigastric region and in the lower abdomen bilaterally. She also reports quick shooting like pains radiating into her vagina.   First started last night and was present for 5 hours until she fell asleep. The pain returned today but is not as severe as last night.     No vaginal bleeding or passage or abnormal discharge, no LOF.   Active FM.     No change in her pain with eating, no change with a BM.  Reports normal Bms. No pain with urination.     Pregnancy thus far has been uncomplicated.      No past medical history on file.    No past surgical history on file.    Family History   Problem Relation Age of Onset    Diabetes Father     Diabetes Maternal Grandmother     Diabetes Paternal Grandmother     Diabetes Brother        Social History     Socioeconomic History    Marital status:      Spouse name: Not on file    Number of children: Not on file    Years of education: Not on file    Highest education level: Not on file   Occupational History    Not on file   Tobacco Use    Smoking status: Never    Smokeless tobacco: Never   Vaping Use    Vaping status: Never Used   Substance and Sexual Activity    Alcohol use: Not Currently     Alcohol/week: 4.0 standard drinks of alcohol     Types: 4 Cans of beer per week    Drug use: Never    Sexual activity: Yes     Partners: Male   Other Topics Concern    Not on file   Social History Narrative    Not on file     Social Determinants of Health     Financial Resource Strain: Medium Risk (7/22/2024)    Overall Financial Resource Strain (CARDIA)     Difficulty of Paying Living Expenses: Somewhat hard   Food Insecurity: Not on file (1/8/2025)   Transportation Needs: Unknown (7/22/2024)    PRAPARE - Transportation     Lack of Transportation (Medical): Not on file     Lack of Transportation

## 2025-01-15 ASSESSMENT — ENCOUNTER SYMPTOMS: ABDOMINAL PAIN: 1

## 2025-01-15 NOTE — ED PROVIDER NOTES
[facility] EMERGENCY DEPARTMENT  EMERGENCY DEPARTMENT ENCOUNTER      Pt Name: Ralph Miranda  MRN: [id number]  Birthdate 1995  Date of evaluation: 2025  Provider: RAJI Hinkle    CHIEF COMPLAINT       Chief Complaint   Patient presents with    Abdominal Pain         HISTORY OF PRESENT ILLNESS   (Location/Symptom, Timing/Onset, Context/Setting, Quality, Duration, Modifying Factors, Severity)  Note limiting factors.       29-year-old female, , at about 31 weeks, presenting to the ED for abdominal pain.  Sent down from OB for additional workup.  Patient reports that she had some upper abdominal discomfort last week that somewhat resolved, then started yesterday while at work.  Patient reports the pain is like a band across the upper abdomen, sharp and intermittent.  Denies any lower abdominal pain.  No vaginal bleeding.  Last bowel movement was 2 days ago.  Denies fever, vomiting, diarrhea, chest pain, urinary symptoms.    Past medical and past surgical history: Up-to-date per patient  Social history: .  Non-smoker.    The history is provided by the patient and the spouse.         Review of External Medical Records:     Nursing Notes were reviewed.    REVIEW OF SYSTEMS    (2-9 systems for level 4, 10 or more for level 5)     Review of Systems   Gastrointestinal:  Positive for abdominal pain.       Except as noted above the remainder of the review of systems was reviewed and negative.       PAST MEDICAL HISTORY     Past Medical History:   Diagnosis Date    Mental disorder          SURGICAL HISTORY       Past Surgical History:   Procedure Laterality Date    WISDOM TOOTH EXTRACTION           CURRENT MEDICATIONS       Discharge Medication List as of 2025 11:50 PM        CONTINUE these medications which have NOT CHANGED    Details   ferrous fumarate-vitamin c (MARK-SEQUELS) 65-25 MG TBCR CR tablet Take 1 tablet by mouth daily (with breakfast)Historical Med      Ferrous Sulfate    examination of this area.      The findings were conveyed via secure electronic text message (Baydin) to   Jessica Sanchez NP on 1/14/2025 9:59 PM by Dr. Laurent Quinteros.  [id number]         Electronically signed by Laurent Quinteros           LABS:  Labs Reviewed   CBC WITH AUTO DIFFERENTIAL - Abnormal; Notable for the following components:       Result Value    RBC 3.67 (*)     Hemoglobin 11.4 (*)     Hematocrit 33.5 (*)     Immature Granulocytes % 0.7 (*)     Immature Granulocytes Absolute 0.07 (*)     All other components within normal limits   COMPREHENSIVE METABOLIC PANEL - Abnormal; Notable for the following components:    Sodium 135 (*)     Glucose 120 (*)     Creatinine 0.45 (*)     Albumin 2.8 (*)     Albumin/Globulin Ratio 0.7 (*)     All other components within normal limits   LIPASE   EXTRA TUBES HOLD       All other labs were within normal range or not returned as of this dictation.    EMERGENCY DEPARTMENT COURSE and DIFFERENTIAL DIAGNOSIS/MDM:   Vitals:    Vitals:    01/14/25 2056   BP: 123/76   Pulse: 87   Resp: 28   Temp: 97.3 °F (36.3 °C)   TempSrc: Oral   SpO2: 96%   Weight: 88.4 kg (194 lb 14.2 oz)   Height: 1.6 m (5' 3\")           Medical Decision Making  29-year-old female about 31 weeks gestation presenting to the ED for upper abdominal pain.  Already seen by L&D.  Broad differential diagnosis including biliary colic, cholecystitis, pancreatitis, gastritis, enteritis, transverse colitis, etc.  On exam, patient with mild nonfocal tenderness across the upper abdomen.  Labs reassuring.  Ultrasound with no concerning findings.  Encouraged patient to follow-up with her OB.    Amount and/or Complexity of Data Reviewed  Radiology: ordered.            REASSESSMENT            CONSULTS:  None    PROCEDURES:  Unless otherwise noted below, none     Procedures      FINAL IMPRESSION      1. Abdominal pain, epigastric          DISPOSITION/PLAN   DISPOSITION Decision To Discharge 01/14/2025 11:23:49

## 2025-01-15 NOTE — PROGRESS NOTES
1930: Bedside and Verbal shift change report given to BHARTI Da Silva RN (oncoming nurse) by ADAM Duffy RN (offgoing nurse). Report included the following information Nurse Handoff Report, Index, Intake/Output, MAR, and Recent Results.    1945: RN to patient bedside for assessment. Patient complaining of severe pain in abdomen that wraps around to her back. Pain is constant. Patient is writhing in bed, breathing through the pain. Patient denies contractions, abdomen palpating soft, toco not detecting uterine activity. Patient denies headaches, visual disturbances, or RUQ pain. VSS. Patient oriented to room and call bell within reach. CNM called to bedside for bedside assessment.    2010: CNM at bedside to assess/evaluate patient. Per CNM patient cleared obstetrically, advised to go to ER to get further evaluation. Patient and family member verbalized understanding. CNM okay with patient coming off EFM at this time.    2020: RN called ED to inform of patient coming down for further evaluation.     2030: Patient and RN reviewed discharge instructions and reason to return to LD. Patient verbalized understanding, and has no further OB complaints at this time. Patient discharged at this time.

## 2025-01-15 NOTE — ED TRIAGE NOTES
Patient in through triage from L&D with complaints of 10/10 upper/left/right abd pain she describes as stabbing. No hx of abd surg. Patient is 31 weeks pregnant.

## 2025-01-15 NOTE — DISCHARGE INSTRUCTIONS
Return to the ER for new or worsening symptoms.  Your ultrasound and blood work tonight was overall reassuring.  Follow-up with your obstetrician.  Tylenol as needed.

## 2025-01-15 NOTE — PROGRESS NOTES
[facility] came to the hospital today after seeing Dr. Aldana.  She is experiencing abdominal pain 8/10.  She describes it as a stabbing pain that radiates around her abdomen and back just under her breasts.  She also reports lower abdominal pain.  This pain started last night.  She has had periods where she feels less pain.  She did get relief with morphine administered here.  Currently she is rating her pain 8/10, again.    FHT - Reactive NST, category 1, Baseline 140,   No contractions.    120/62, 97.8, 82, 17    Labs benign  Cervix checked in office and was closed.  Abdomen soft, no contractions    No obstetrical reason for cause of pain found.  Discharge from L&D.    [facility] was sent to the ER for further evaluation.

## 2025-01-15 NOTE — PROGRESS NOTES
Spoke with RN around 1840 on pt status. Pt is feeling better after dose of IV morphine.   CBC returned and normal.   UA with trace ketones.  Per RN the patient started oral iron 2 days ago.   Advised to d/c the iron as this may be causing GI upset.     Transitioned further follow-up care/reassessment to KATHY Fuller.     Natasha Aldana MD

## 2025-01-15 NOTE — ED NOTES
29-year-old female presents with a history of low back pain, depression, anxiety abdominal pain.  Diffuse upper quadrants. Sharp and intermittent.  Worse with movement, not related to food.  Onset yesterday at work.  No associated trauma or inciting event.  No recent antibiotics.  Last bowel movement 2 days ago.  Denies fevers, nausea, vomiting, diarrhea, chest pain, difficulty breathing, vaginal bleeding, urinary symptoms.  Presents from OB upstairs. No OTC.         8:56 PM  I have evaluated the patient as the Provider in Rapid Medical Evaluation (RME). I have reviewed her vital signs and the triage nurse assessment. I have talked with the patient and any available family and advised that I am the provider in triage and have ordered the appropriate study to initiate their work up based on the clinical presentation during my assessment. I have advised that the patient will be accommodated in the Main ED as soon as possible. I have also requested to contact the triage nurse or myself immediately if the patient experiences any changes in their condition during this brief waiting period.  TODD Laguna NP, Kelly E, APRN - NP  [phone]

## 2025-01-31 ENCOUNTER — ROUTINE PRENATAL (OUTPATIENT)
Age: 30
End: 2025-01-31
Payer: MEDICAID

## 2025-01-31 VITALS — SYSTOLIC BLOOD PRESSURE: 113 MMHG | HEART RATE: 81 BPM | DIASTOLIC BLOOD PRESSURE: 75 MMHG

## 2025-01-31 DIAGNOSIS — Z3A.34 34 WEEKS GESTATION OF PREGNANCY: Primary | ICD-10-CM

## 2025-01-31 PROCEDURE — 76816 OB US FOLLOW-UP PER FETUS: CPT | Performed by: OBSTETRICS & GYNECOLOGY

## 2025-01-31 NOTE — PROCEDURES
76%.  Amniotic fluid: normal.  Placenta is anterior, appropriate distance from the internal os.  Cephalic presentation.    *  The ultrasound findings as listed above and diagnostic limitations of ultrasound imaging, including inability to exclude all anomalies, have been reviewed with the patient. All  questions and concerns addressed.    Consultation  ==========    Patient previously seen in full MFM consult on 10/29/2024. Patient had scan at outside OB office that showed superior ventricle to measure 1.1 cm  29 year old G1 with no PMH of note. Paternal niece with Pranav's congenital atrophy    NIPT normal ()  Glucola 146 mg/dl ()  3 our GTT (): 97/147/119/113    Reviewed that dependent (left) fetal ventricle measures 0.62. Superior (right) ventricle measures .89-.91 mm; these are both in the normal range     management depends mainly on the rate of progression of ventriculomegaly and the development of hydrocephalus. Neurodevelopmental outcomes and prognosis  are influenced mainly by the severity of both ventriculomegaly and underlying structural or genetic abnormalities. Reviewed that univentricular dilation is less common than  bilateral ventriculomegaly. The causes of fetal ventriculomegaly are divided into three main categories: cerebral parenchymal loss; obstructive causes; and overproduction of  cerebrospinal fluid. Although isolated ventriculomegaly may be benign, chromosomal abnormalities are found in 2%-12% of cases. Different causative processes include  abnormal turnover of CSF, neuronal and migration disorders, and infection. Some cases are inherited X-linked or, in rare cases, autosomal recessive traits. Intrauterine  infections are found in approximately 5% of patients with fetal ventriculomegaly, and Toxoplasma gondii, rubella, cytomegalovirus, and herpes simplex virus infections are  found in 10%-20% of those with severe isolated ventriculomegaly . The most common structural

## 2025-02-04 ENCOUNTER — ROUTINE PRENATAL (OUTPATIENT)
Age: 30
End: 2025-02-04
Payer: MEDICAID

## 2025-02-04 VITALS
HEIGHT: 63 IN | HEART RATE: 108 BPM | WEIGHT: 198 LBS | OXYGEN SATURATION: 97 % | RESPIRATION RATE: 16 BRPM | TEMPERATURE: 97.9 F | DIASTOLIC BLOOD PRESSURE: 65 MMHG | BODY MASS INDEX: 35.08 KG/M2 | SYSTOLIC BLOOD PRESSURE: 98 MMHG

## 2025-02-04 DIAGNOSIS — Z34.90 PREGNANCY, UNSPECIFIED GESTATIONAL AGE: Primary | ICD-10-CM

## 2025-02-04 PROCEDURE — 90715 TDAP VACCINE 7 YRS/> IM: CPT | Performed by: OBSTETRICS & GYNECOLOGY

## 2025-02-04 PROCEDURE — 90471 IMMUNIZATION ADMIN: CPT | Performed by: OBSTETRICS & GYNECOLOGY

## 2025-02-04 PROCEDURE — 0502F SUBSEQUENT PRENATAL CARE: CPT | Performed by: OBSTETRICS & GYNECOLOGY

## 2025-02-04 RX ORDER — RESPIRATORY SYNCYTIAL VIRUS VACCINE 120MCG/0.5
0.5 KIT INTRAMUSCULAR ONCE
Qty: 0.5 ML | Refills: 0 | Status: SHIPPED | OUTPATIENT
Start: 2025-02-04 | End: 2025-02-04

## 2025-02-04 NOTE — PROGRESS NOTES
Routine prenatal visit today.  Normal fetal movements.  Tdap vaccine today.   RSV vaccine given today.    GBS next visit.

## 2025-02-12 ENCOUNTER — HOSPITAL ENCOUNTER (EMERGENCY)
Facility: HOSPITAL | Age: 30
Discharge: HOME | End: 2025-02-12
Payer: MEDICAID

## 2025-02-12 ENCOUNTER — TELEPHONE (OUTPATIENT)
Age: 30
End: 2025-02-12

## 2025-02-12 VITALS
OXYGEN SATURATION: 98 % | SYSTOLIC BLOOD PRESSURE: 116 MMHG | HEART RATE: 100 BPM | TEMPERATURE: 97.6 F | RESPIRATION RATE: 16 BRPM | DIASTOLIC BLOOD PRESSURE: 75 MMHG

## 2025-02-12 PROCEDURE — 99282 EMERGENCY DEPT VISIT SF MDM: CPT

## 2025-02-12 ASSESSMENT — ENCOUNTER SYMPTOMS: ABDOMINAL PAIN: 1
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
## 2025-02-18 ENCOUNTER — ROUTINE PRENATAL (OUTPATIENT)
Age: 30
End: 2025-02-18

## 2025-02-18 VITALS
BODY MASS INDEX: 35.79 KG/M2 | RESPIRATION RATE: 16 BRPM | SYSTOLIC BLOOD PRESSURE: 111 MMHG | HEART RATE: 91 BPM | OXYGEN SATURATION: 97 % | DIASTOLIC BLOOD PRESSURE: 70 MMHG | TEMPERATURE: 99.4 F | HEIGHT: 63 IN | WEIGHT: 202 LBS

## 2025-02-18 DIAGNOSIS — N89.8 VAGINAL DISCHARGE: ICD-10-CM

## 2025-02-18 DIAGNOSIS — Z34.90 PREGNANCY, UNSPECIFIED GESTATIONAL AGE: Primary | ICD-10-CM

## 2025-02-18 PROCEDURE — 0502F SUBSEQUENT PRENATAL CARE: CPT | Performed by: OBSTETRICS & GYNECOLOGY
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
## 2025-02-18 NOTE — PROGRESS NOTES
Routine prenatal visit today.  Normal very active fetal movements.  She is very uncomfortable with discomforts of pregnancy.   Her  found out he has fractures in his back.   Cephalic on recent MFM US.    GBS today.   Spec exam due to her \"feeling wet and gushes of leaking for the past 3 days. Exam with no pooling, nitrazine neg. Slightly increased amount of white discharge, nuswab for BV and yeast collected.   Cervix closed and high.    17

## 2025-02-20 LAB — GP B STREP DNA SPEC QL NAA+PROBE: NEGATIVE

## 2025-02-21 LAB
A VAGINAE DNA VAG QL NAA+PROBE: ABNORMAL SCORE
BVAB2 DNA VAG QL NAA+PROBE: ABNORMAL SCORE
C ALBICANS DNA VAG QL NAA+PROBE: NEGATIVE
C GLABRATA DNA VAG QL NAA+PROBE: NEGATIVE
MEGA1 DNA VAG QL NAA+PROBE: ABNORMAL SCORE
T VAGINALIS DNA VAG QL NAA+PROBE: NEGATIVE

## 2025-02-21 RX ORDER — METRONIDAZOLE 7.5 MG/G
1 GEL VAGINAL NIGHTLY
Qty: 70 G | Refills: 0 | Status: SHIPPED | OUTPATIENT
Start: 2025-02-21 | End: 2025-02-26

## 2025-02-22 ENCOUNTER — HOSPITAL ENCOUNTER (OUTPATIENT)
Facility: HOSPITAL | Age: 30
Setting detail: OBSERVATION
Discharge: HOME | End: 2025-02-23
Attending: OBSTETRICS & GYNECOLOGY | Admitting: OBSTETRICS & GYNECOLOGY
Payer: MEDICAID
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
## 2025-02-22 LAB
APTT PPP: 25.9 SEC (ref 22.1–31)
BASOPHILS # BLD: 0.01 K/UL (ref 0–0.1)
BASOPHILS NFR BLD: 0.1 % (ref 0–1)
DIFFERENTIAL METHOD BLD: ABNORMAL
EOSINOPHIL # BLD: 0.06 K/UL (ref 0–0.4)
EOSINOPHIL NFR BLD: 0.6 % (ref 0–7)
ERYTHROCYTE [DISTWIDTH] IN BLOOD BY AUTOMATED COUNT: 13.5 % (ref 11.5–14.5)
FETAL BLOOD VOL PATIENT KLEIH BETKE: NORMAL ML
FIBRINOGEN PPP-MCNC: 790 MG/DL (ref 200–475)
HCT VFR BLD AUTO: 37.3 % (ref 35–47)
HGB BLD-MCNC: 12.2 G/DL (ref 11.5–16)
IMM GRANULOCYTES # BLD AUTO: 0.08 K/UL (ref 0–0.04)
IMM GRANULOCYTES NFR BLD AUTO: 0.8 % (ref 0–0.5)
INR PPP: 0.9 (ref 0.9–1.1)
LYMPHOCYTES # BLD: 2.04 K/UL (ref 0.8–3.5)
LYMPHOCYTES NFR BLD: 19.7 % (ref 12–49)
MCH RBC QN AUTO: 30.6 PG (ref 26–34)
MCHC RBC AUTO-ENTMCNC: 32.7 G/DL (ref 30–36.5)
MCV RBC AUTO: 93.5 FL (ref 80–99)
MONOCYTES # BLD: 0.78 K/UL (ref 0–1)
MONOCYTES NFR BLD: 7.5 % (ref 5–13)
NEUTS SEG # BLD: 7.4 K/UL (ref 1.8–8)
NEUTS SEG NFR BLD: 71.3 % (ref 32–75)
NRBC # BLD: 0 K/UL (ref 0–0.01)
NRBC BLD-RTO: 0 PER 100 WBC
PLATELET # BLD AUTO: 216 K/UL (ref 150–400)
PMV BLD AUTO: 11.6 FL (ref 8.9–12.9)
PROTHROMBIN TIME: 9.8 SEC (ref 9.2–11.2)
RBC # BLD AUTO: 3.99 M/UL (ref 3.8–5.2)
THERAPEUTIC RANGE: NORMAL SECS (ref 58–77)
WBC # BLD AUTO: 10.4 K/UL (ref 3.6–11)

## 2025-02-22 PROCEDURE — G0378 HOSPITAL OBSERVATION PER HR: HCPCS

## 2025-02-22 PROCEDURE — 36415 COLL VENOUS BLD VENIPUNCTURE: CPT

## 2025-02-22 PROCEDURE — 4500000002 HC ER NO CHARGE

## 2025-02-22 PROCEDURE — 85610 PROTHROMBIN TIME: CPT

## 2025-02-22 PROCEDURE — 96360 HYDRATION IV INFUSION INIT: CPT

## 2025-02-22 PROCEDURE — 6370000000 HC RX 637 (ALT 250 FOR IP): Performed by: ADVANCED PRACTICE MIDWIFE

## 2025-02-22 PROCEDURE — 85730 THROMBOPLASTIN TIME PARTIAL: CPT

## 2025-02-22 PROCEDURE — 85460 HEMOGLOBIN FETAL: CPT

## 2025-02-22 PROCEDURE — 96361 HYDRATE IV INFUSION ADD-ON: CPT

## 2025-02-22 PROCEDURE — APPNB15 APP NON BILLABLE TIME 0-15 MINS: Performed by: ADVANCED PRACTICE MIDWIFE

## 2025-02-22 PROCEDURE — 85384 FIBRINOGEN ACTIVITY: CPT

## 2025-02-22 PROCEDURE — APPNB30 APP NON BILLABLE TIME 0-30 MINS: Performed by: ADVANCED PRACTICE MIDWIFE

## 2025-02-22 PROCEDURE — 2580000003 HC RX 258: Performed by: ADVANCED PRACTICE MIDWIFE

## 2025-02-22 PROCEDURE — 85025 COMPLETE CBC W/AUTO DIFF WBC: CPT

## 2025-02-22 PROCEDURE — 99284 EMERGENCY DEPT VISIT MOD MDM: CPT

## 2025-02-22 RX ORDER — ONDANSETRON 2 MG/ML
4 INJECTION INTRAMUSCULAR; INTRAVENOUS EVERY 6 HOURS PRN
Status: DISCONTINUED | OUTPATIENT
Start: 2025-02-22 | End: 2025-02-23 | Stop reason: HOSPADM

## 2025-02-22 RX ORDER — ACETAMINOPHEN 500 MG
1000 TABLET ORAL EVERY 6 HOURS PRN
Status: DISCONTINUED | OUTPATIENT
Start: 2025-02-22 | End: 2025-02-23 | Stop reason: HOSPADM

## 2025-02-22 RX ORDER — SODIUM CHLORIDE 0.9 % (FLUSH) 0.9 %
5-40 SYRINGE (ML) INJECTION PRN
Status: DISCONTINUED | OUTPATIENT
Start: 2025-02-22 | End: 2025-02-23 | Stop reason: HOSPADM

## 2025-02-22 RX ORDER — TERBUTALINE SULFATE 1 MG/ML
0.25 INJECTION SUBCUTANEOUS
Status: DISCONTINUED | OUTPATIENT
Start: 2025-02-22 | End: 2025-02-23 | Stop reason: HOSPADM

## 2025-02-22 RX ORDER — CALCIUM CARBONATE 500 MG/1
500 TABLET, CHEWABLE ORAL 3 TIMES DAILY PRN
Status: DISCONTINUED | OUTPATIENT
Start: 2025-02-22 | End: 2025-02-23 | Stop reason: HOSPADM

## 2025-02-22 RX ORDER — METRONIDAZOLE 250 MG/1
500 TABLET ORAL 2 TIMES DAILY
Status: DISCONTINUED | OUTPATIENT
Start: 2025-02-22 | End: 2025-02-23 | Stop reason: HOSPADM

## 2025-02-22 RX ORDER — SODIUM CHLORIDE, SODIUM LACTATE, POTASSIUM CHLORIDE, AND CALCIUM CHLORIDE .6; .31; .03; .02 G/100ML; G/100ML; G/100ML; G/100ML
500 INJECTION, SOLUTION INTRAVENOUS ONCE
Status: COMPLETED | OUTPATIENT
Start: 2025-02-22 | End: 2025-02-22

## 2025-02-22 RX ORDER — SODIUM CHLORIDE 0.9 % (FLUSH) 0.9 %
5-40 SYRINGE (ML) INJECTION EVERY 12 HOURS SCHEDULED
Status: DISCONTINUED | OUTPATIENT
Start: 2025-02-22 | End: 2025-02-23 | Stop reason: HOSPADM

## 2025-02-22 RX ORDER — ACETAMINOPHEN 500 MG
1000 TABLET ORAL EVERY 6 HOURS SCHEDULED
Status: DISCONTINUED | OUTPATIENT
Start: 2025-02-22 | End: 2025-02-22

## 2025-02-22 RX ORDER — SODIUM CHLORIDE, SODIUM LACTATE, POTASSIUM CHLORIDE, AND CALCIUM CHLORIDE .6; .31; .03; .02 G/100ML; G/100ML; G/100ML; G/100ML
1000 INJECTION, SOLUTION INTRAVENOUS ONCE
Status: COMPLETED | OUTPATIENT
Start: 2025-02-22 | End: 2025-02-23

## 2025-02-22 RX ORDER — SODIUM CHLORIDE 9 MG/ML
INJECTION, SOLUTION INTRAVENOUS PRN
Status: DISCONTINUED | OUTPATIENT
Start: 2025-02-22 | End: 2025-02-23 | Stop reason: HOSPADM

## 2025-02-22 RX ORDER — ONDANSETRON 4 MG/1
4 TABLET, ORALLY DISINTEGRATING ORAL EVERY 6 HOURS PRN
Status: DISCONTINUED | OUTPATIENT
Start: 2025-02-22 | End: 2025-02-23 | Stop reason: HOSPADM

## 2025-02-22 RX ADMIN — METRONIDAZOLE 500 MG: 250 TABLET ORAL at 21:22

## 2025-02-22 RX ADMIN — CALCIUM CARBONATE (ANTACID) CHEW TAB 500 MG 500 MG: 500 CHEW TAB at 19:20

## 2025-02-22 RX ADMIN — SODIUM CHLORIDE, POTASSIUM CHLORIDE, SODIUM LACTATE AND CALCIUM CHLORIDE 1000 ML: 600; 310; 30; 20 INJECTION, SOLUTION INTRAVENOUS at 21:23

## 2025-02-22 RX ADMIN — ACETAMINOPHEN 1000 MG: 500 TABLET ORAL at 18:48

## 2025-02-22 RX ADMIN — SODIUM CHLORIDE, POTASSIUM CHLORIDE, SODIUM LACTATE AND CALCIUM CHLORIDE 500 ML: 600; 310; 30; 20 INJECTION, SOLUTION INTRAVENOUS at 20:15

## 2025-02-22 ASSESSMENT — PAIN SCALES - GENERAL: PAINLEVEL_OUTOF10: 6

## 2025-02-22 ASSESSMENT — PAIN DESCRIPTION - DESCRIPTORS: DESCRIPTORS: ACHING;DULL

## 2025-02-22 ASSESSMENT — PAIN DESCRIPTION - LOCATION: LOCATION: ABDOMEN

## 2025-02-22 NOTE — ED PROVIDER NOTES
Department of Obstetrics and Gynecology  Nurse Practitioner EDIN Obstetrics History and Physical        CHIEF COMPLAINT:  Fall    HISTORY OF PRESENT ILLNESS:      The patient is a 29 y.o.  1 parity 0000 at 37w1d with an CINDY of 3/14/25.  Patient presents to the EDIN for a fall. Reports she was walking down her stairs inside her home, she had socks on and slipped. She ended up falling down about 5 stairs on her bottom and states she fell \"hard\". No direct trauma to abdomen. Pt reports having a lot of pain since. She is having constant pelvic pain/pressure. In addition she is having frequent sharp/stabbing pains in umbilical area and left side of abdomen. Reports the pain is about a 6/10 on the pain scale. Denies VB, but reports after the fall she noted her underwear was wet. It was clear and odorless. Wore a pad into EDIN, but it is dry. Endorses good fetal movement.       PRENATAL CARE:    Primary Provider: Ranjeet Aldana, Ob Gyn        G1   EDC by 6 wk US     Pregnancy problems:  Anxiety and depression:  not on meds currently, stable  L5 pinched disc: chronic pain in pregnancy  Right lateral ventricle mildly dilated 1.1cm at 28 weeks: MFM FU WNL     IOB labs:  O pos, normal, VZV NI--> PP vacc,  GC/Chl neg, urine cult neg  Genetic Screening: NIPT low risk BOY!! and carrier screening 4 panel neg  Anatomy: normal anatomy with MFM, anterior placenta  Flu: declines  TDAP: given 2/4   RSV: vaccine rx at 32 week ___  Third Tri Labs: 1 hr -->  3 hr GTT - only 1 high value, 11.8/35.5  GBS: neg     Pain mgmt. in labor: low intervention as possible, will try no epidural  Feeding: breast - has pump  Circ: desires  Social: patient is a  at [employer] and  Jv is a  at [employer]  Birth preferences: desires labor in water, delayed cord clamping when cord stops pulsating, low intervention    OB History    Para Term  AB Living   1             SAB IAB Ectopic Molar  Fibrinogen/pt/ptt, KB  Admit to observation, continuous fetal and toco monitoring for approx 23 hours.   Discussed with Dr Ruiz, in agreement with POC.   Report given to KATHY Lawrence for continuation of care.

## 2025-02-22 NOTE — ED TRIAGE NOTES
1603: Patient arrived from home c/o pain from a fall down stairs where she slipped and hit her bottom and back 30 minutes before.     1611: SHERINE Ng CNM at bedside, Nitrazine negative. SVE closed.   1627: Patient transferred to L&D room 1 for prolonged monitoring.   1805: Patient feeling pain in lower abdomen. Esperanza CHAVEZ at bedside. Plan to order Tylenol and heating pad.   1843: Rosie Novii placed on patient to monitor, heat packs given.   1848: Tylenol given, 1000 mg.     1945: Bedside and Verbal shift change report given to PASHA Finley (oncoming nurse) by LEMUEL Lewis RN (offgoing nurse). Report included the following information Nurse Handoff Report, Intake/Output, and MAR.

## 2025-02-23 VITALS
OXYGEN SATURATION: 99 % | HEART RATE: 103 BPM | SYSTOLIC BLOOD PRESSURE: 112 MMHG | RESPIRATION RATE: 16 BRPM | DIASTOLIC BLOOD PRESSURE: 58 MMHG | TEMPERATURE: 98.4 F

## 2025-02-23 PROCEDURE — 6370000000 HC RX 637 (ALT 250 FOR IP): Performed by: ADVANCED PRACTICE MIDWIFE

## 2025-02-23 PROCEDURE — G0378 HOSPITAL OBSERVATION PER HR: HCPCS

## 2025-02-23 PROCEDURE — 2500000003 HC RX 250 WO HCPCS: Performed by: ADVANCED PRACTICE MIDWIFE

## 2025-02-23 PROCEDURE — APPNB30 APP NON BILLABLE TIME 0-30 MINS: Performed by: ADVANCED PRACTICE MIDWIFE

## 2025-02-23 RX ORDER — NALBUPHINE HYDROCHLORIDE 10 MG/ML
10 INJECTION INTRAMUSCULAR; INTRAVENOUS; SUBCUTANEOUS
Status: DISCONTINUED | OUTPATIENT
Start: 2025-02-23 | End: 2025-02-23 | Stop reason: HOSPADM

## 2025-02-23 RX ADMIN — METRONIDAZOLE 500 MG: 250 TABLET ORAL at 09:06

## 2025-02-23 RX ADMIN — SODIUM CHLORIDE, PRESERVATIVE FREE 10 ML: 5 INJECTION INTRAVENOUS at 09:13

## 2025-02-23 RX ADMIN — ACETAMINOPHEN 1000 MG: 500 TABLET ORAL at 09:06

## 2025-02-23 ASSESSMENT — PAIN DESCRIPTION - ORIENTATION: ORIENTATION: LOWER

## 2025-02-23 ASSESSMENT — PAIN DESCRIPTION - DESCRIPTORS: DESCRIPTORS: CRAMPING

## 2025-02-23 ASSESSMENT — PAIN SCALES - GENERAL: PAINLEVEL_OUTOF10: 6

## 2025-02-23 ASSESSMENT — PAIN DESCRIPTION - LOCATION: LOCATION: ABDOMEN

## 2025-02-23 NOTE — PROGRESS NOTES
RN called out to notify patient is having cramping though taking Tylenol and using a heat pad.    Uterine irritability noted, with mild contractions  Reassuring category 1 fetal surveillance  Patient does have a IV in place, will give LR 1000 bolus    TODD Rios CNM

## 2025-02-23 NOTE — PROGRESS NOTES
0750 Bedside and Verbal shift change report received from María Young RN. Report included the following information Nurse Handoff Report, Intake/Output, MAR, and Recent Results.    0810 Dr. Carrillo at bedside to assess, FHR tracing reviewed. Patient complains of intermittent lower abdominal discomfort when she moves or baby moves, abdomen palpates soft. Denies vaginal bleeding or leaking of fluid from her vagina.  0845 Eating breakfast  0906 Flagyl 500 mg PO given, Tylenol 1000 mg PO given for discomfort, rating 5-6/10.  1100 Patient reports discomfort improving, now tolerable, rating 3/10  1400 Resting comfortably  1455 Confirmed with KATHY Hunter patient to be discharged home  1503 Novii wireless FHR monitor discontinued  1510 IV discontinued, Awaiting discharge   1520 Written and verbal patient discharge and follow up instructions provided   1525 Discharged home ambulatory

## 2025-02-23 NOTE — PROGRESS NOTES
0450 - Bedside and Verbal report given to ANGELICA Young RN (oncoming nurse) by PASHA Finley RN (offgoing nurse). Report included the following information Nurse Handoff Report, MAR, and Recent Results.     0638 - RN to bedside, FHR not tracing on novii. Pt repositioned to left semi-lateral.      0750 - Shift report given to D. Seaver RN by ANGELICA Young RN.

## 2025-02-23 NOTE — PROGRESS NOTES
Into see patient  Patient reports feeling discomfort in her stomach points mostly to her right middle quadrant, gross fetal movement noted with discomfort.    Fetus reassuring status  No contractions noted  Abdomen palpating soft nontender    We discussed likely due to stress muscles from the fall.  Offered Tylenol patient agreeable and heating pad patient agreeable will reposition.  Discussed will keep on monitor for 23 hours,    Able to eat 6 hours post fall, (9:30 PM) clear liquids until then    All questions answered    TODD Rios CNM

## 2025-02-23 NOTE — PROGRESS NOTES
Ante Partum Progress Note    Ralph Miranda  37w2d    Assessment: 37w2d   Fall 25 @ 1530  Reassuring fetal surveillance category 1 tracing  Mild you uterine irritability off and on, not in labor  Symphysis pubic discomfort likely due to foot to the fall  Vital signs stable  Abruption labs within normal limits    Plan:  Discharge home with the following: Activity: ad max Diet: as tolerated. Follow up in office: this week. Medications: prenatal vitamins and tylenol, MetroGel for BV already sent to the pharmacy.    Orders/Charges: Medium    Patient states she does not have headache , abdominal pain  , contractions, right upper quadrant pain  , vaginal bleeding , swelling, and vaginal leaking of fluid     Vitals:  /62   Pulse 84   Temp 98.4 °F (36.9 °C) (Oral)   Resp 16   LMP 2024   SpO2 99%   Temp (24hrs), Av.3 °F (36.8 °C), Min:98.2 °F (36.8 °C), Max:98.4 °F (36.9 °C)      Last 24hr Input/Output:  No intake or output data in the 24 hours ending 25 1008     Non stress test:  Reactive    Fetal Surveillance   Mode: External US  Baseline Rate: 135 bpm  Baseline Classification: Normal  Variability: Moderate  Pattern: Accelerations  Patient Feels Fetal Movement: Yes  Multiple birth?: No  FHR A Comments: Dr. Carrillo at bedside  Baseline Rate: 135 bpm    Variability: Moderate    Contractions  Contraction Frequency: occasional  Contraction Duration: 30-60       Exam:  Patient without distress.     Abdomen, fundus soft non-tender     Extremities, no redness or tenderness               Additional Exam: Patient without distress  Abdomen soft, non-tender  Fundus soft and non tender  Right upper quadrant non-tender  Perineum No sign of blood or amniotic fluid  Lower extremities edema 1+    Labs:     Lab Results   Component Value Date/Time    WBC 10.4 2025 04:38 PM    WBC 9.6 2025 09:16 PM    WBC 9.3 2025 05:07 PM    WBC 8.8 2024 09:52 AM    WBC 5.2 2024 11:27 AM    HGB

## 2025-02-23 NOTE — PROGRESS NOTES
1930 Bedside shift change report given to MARY Finley RN (oncoming nurse) by SKY Lewis RN (offgoing nurse). Report included the following information Nurse Handoff Report, Adult Overview, Surgery Report, MAR, and Recent Results.     2122 Patient still feeling uncomfortable, contractions are palpating mild but patient is hurting with them and fetal movement. Fluid bolus administered per CNM.    0015 CNM called due to patient still feeling discomfort after fluid bolus and heat pack. Received order for nubain.     0030 RN at bedside to administer nubain for pain and patient asleep. Will reassess need for pain medication.     0245 RN at bedside to reassess patient, still sleeping.     0450 Bedside shift change report given to CEFERINO Young RN (oncoming nurse) by MARY Finley RN (offgoing nurse). Report included the following information Nurse Handoff Report and Adult Overview.

## 2025-02-23 NOTE — DISCHARGE INSTRUCTIONS
Pregnancy Precautions: Care Instructions  You should keep your scheduled office appointment with Dr. Aldana on 25 or call to be seen sooner if your symptoms worsen  You should continue taking your medications as directed by your provider  You should notify your provider or the on call provider if you are experiencing regular uterine contractions, vaginal bleeding or leaking fluid from your vagina or if your baby has stopped moving or is moving less than normal  Please follow the instructions below on when to call for help    Overview     There is no sure way to prevent labor before your due date ( labor) or to prevent most other pregnancy problems. But there are things you can do to increase your chances of a healthy pregnancy. Go to your appointments, follow your doctor's advice, and take good care of yourself. Eat healthy foods, and exercise (if your doctor agrees). And make sure to drink plenty of water.  Follow-up care is a key part of your treatment and safety. Be sure to make and go to all appointments, and call your doctor if you are having problems. It's also a good idea to know your test results and keep a list of the medicines you take.  How can you care for yourself at home?  Make sure you go to your prenatal appointments. At each visit, your doctor will check your blood pressure and weight. Your doctor will also listen for a fetal heartbeat and measure the size of the uterus.  Drink plenty of fluids. Dehydration can cause contractions. If you have kidney, heart, or liver disease and have to limit fluids, talk with your doctor before you increase the amount of fluids you drink.  Tell your doctor right away if you notice any symptoms of an infection, such as:  Burning when you urinate.  A frequent need to urinate without being able to pass much urine.  A foul-smelling discharge from your vagina.  Vaginal itching.  Unexplained fever.  Unusual pain or soreness in your uterus or lower

## 2025-02-25 ENCOUNTER — ROUTINE PRENATAL (OUTPATIENT)
Age: 30
End: 2025-02-25

## 2025-02-25 VITALS
HEART RATE: 92 BPM | SYSTOLIC BLOOD PRESSURE: 101 MMHG | HEIGHT: 63 IN | DIASTOLIC BLOOD PRESSURE: 65 MMHG | RESPIRATION RATE: 20 BRPM | OXYGEN SATURATION: 96 % | BODY MASS INDEX: 36.14 KG/M2 | WEIGHT: 204 LBS | TEMPERATURE: 97.7 F

## 2025-02-25 DIAGNOSIS — Z34.90 PREGNANCY, UNSPECIFIED GESTATIONAL AGE: Primary | ICD-10-CM

## 2025-02-25 PROCEDURE — 0502F SUBSEQUENT PRENATAL CARE: CPT | Performed by: OBSTETRICS & GYNECOLOGY

## 2025-02-25 NOTE — PROGRESS NOTES
Routine prenatal visit today.  Normal fetal movements.  Had a fall over the weekend down 5 steps, did not hit her abdomen, but her buttock took the impact. Did not have any bleeding. Had an increase in contractions after this fall, which have since subsided.  Still having irregular BHC.    Labor and fall prec reviewed.   Cervix still closed today.

## 2025-02-25 NOTE — PROGRESS NOTES
Relays that she was not paying attention and fell down steps. Denies trauma to belly    Active FM  Denies lof, vb but is having BH contractions    Cervical check  RSV vaccine: 2/15/2025

## 2025-03-04 ENCOUNTER — ROUTINE PRENATAL (OUTPATIENT)
Age: 30
End: 2025-03-04
Payer: MEDICAID

## 2025-03-04 VITALS
RESPIRATION RATE: 15 BRPM | HEART RATE: 94 BPM | DIASTOLIC BLOOD PRESSURE: 72 MMHG | OXYGEN SATURATION: 96 % | TEMPERATURE: 98.1 F | WEIGHT: 204 LBS | HEIGHT: 63 IN | BODY MASS INDEX: 36.14 KG/M2 | SYSTOLIC BLOOD PRESSURE: 107 MMHG

## 2025-03-04 VITALS — DIASTOLIC BLOOD PRESSURE: 72 MMHG | SYSTOLIC BLOOD PRESSURE: 133 MMHG | HEART RATE: 76 BPM

## 2025-03-04 DIAGNOSIS — O36.8130 DECREASED FETAL MOVEMENTS IN THIRD TRIMESTER, SINGLE OR UNSPECIFIED FETUS: ICD-10-CM

## 2025-03-04 DIAGNOSIS — Z34.90 PREGNANCY, UNSPECIFIED GESTATIONAL AGE: Primary | ICD-10-CM

## 2025-03-04 PROCEDURE — 0502F SUBSEQUENT PRENATAL CARE: CPT | Performed by: OBSTETRICS & GYNECOLOGY

## 2025-03-04 PROCEDURE — 59025 FETAL NON-STRESS TEST: CPT | Performed by: OBSTETRICS & GYNECOLOGY

## 2025-03-04 PROCEDURE — 76818 FETAL BIOPHYS PROFILE W/NST: CPT | Performed by: OBSTETRICS & GYNECOLOGY

## 2025-03-04 NOTE — PROCEDURES
interpretation: reactive. Test duration 20 min. Baseline  bpm. Baseline variability: moderate. Accelerations: present. Decelerations: absent. Uterine activity:  absent. Acoustic stimulation: no    Findings  =======    Intrauterine Isaacs pregnancy at 38w 4d by clinical dates.  EFW is 3206 g at 37%, abdominal circumference at 41%.  Anatomy visualized as stated above.  Amniotic fluid: normal.  Placenta is Anterior, anterior, appropriate distance from the internal os.  Cephalic presentation.  Biophysical profile score is 10/10.    The ultrasound findings as listed above and diagnostic limitations of ultrasound imaging, including inability to exclude all anomalies, have been reviewed with the patient. All  questions and concerns addressed.    Consultation  ==========    Patient previously seen in full MFM consult on 10/29/2024. Patient had scan at outside OB office that showed superior ventricle to measure 1.1 cm  29 year old G1 with no PMH of note. Paternal niece with Pranav's congenital atrophy    NIPT normal ()  Glucola 146 mg/dl ()  3 our GTT (): 97/147/119/113    Reviewed that dependent (left) fetal ventricle measures 0.62. Superior (right) ventricle measures .89-.91 mm; these are both in the normal range     management depends mainly on the rate of progression of ventriculomegaly and the development of hydrocephalus. Neurodevelopmental outcomes and prognosis  are influenced mainly by the severity of both ventriculomegaly and underlying structural or genetic abnormalities. Reviewed that univentricular dilation is less common than  bilateral ventriculomegaly. The causes of fetal ventriculomegaly are divided into three main categories: cerebral parenchymal loss; obstructive causes; and overproduction of  cerebrospinal fluid. Although isolated ventriculomegaly may be benign, chromosomal abnormalities are found in 2%-12% of cases. Different causative processes include  abnormal turnover

## 2025-03-04 NOTE — PROGRESS NOTES
Provided verbal and written communication regarding Non-Stress Test (NST), fetal movement counts, labor precautions, and signs and symptoms of pre-eclampsia. Patient verbalized understanding of the information provided. All patient questions were answered

## 2025-03-04 NOTE — PROGRESS NOTES
Patient arrived to room desiring to talk about induction.  She states that she is desiring induction due to her pelvic pain increasing.  6/10 on pain scale.    Patient states decreased fetal movement yesterday and today stating that baby used to \"move all the time all day\" and has now only had a few small movements today.    Contractions q20 minutes/mild per patient    Patient has finished her metrogel for BV and states her discharge is \"still weird\" describing it as \"thick, white, sticky\".    GBS neg

## 2025-03-04 NOTE — PROGRESS NOTES
Routine prenatal visit today.  Feeling very uncomfortable and ready for delivery, tearful.  Ralph is noting less movements in the past 2 days at night.     NST added to her visit and reassured re very reactive, Cat 1.   Added onto MFM today for EFW/BPP (no availability with out office).   Fetal movement prec discussed.     NST procedure note    Ralph Miranda is a ,  29 y.o. female Black /   White (non-) whose LMP  was on  who presents for fetal non-stress test.    She is 38w4d and was monitored for >20 minutes and the FHR was reactive, with accelerations.  Category 1, reactive.     NST Interpretation:    FHR baseline 135 bpm, variability moderate, accelerations present, decelerations Absent.    Uterine contractions were irregular, one contraction present.    Ralph was informed of the NST results and her questions were answered.    Disposition:    - return to clinic as scheduled

## 2025-03-10 NOTE — PROGRESS NOTES
Doing well since last visit, was seen in the ER for blurred vision and not being able to form sentences.     Has been keeping tract of blood pressures - they have been WNL    Active FM  Denies lof, vb & contractions    Cervical check accepted

## 2025-03-12 ENCOUNTER — ROUTINE PRENATAL (OUTPATIENT)
Age: 30
End: 2025-03-12

## 2025-03-12 VITALS
WEIGHT: 203 LBS | SYSTOLIC BLOOD PRESSURE: 121 MMHG | DIASTOLIC BLOOD PRESSURE: 81 MMHG | BODY MASS INDEX: 35.96 KG/M2 | HEART RATE: 91 BPM

## 2025-03-12 DIAGNOSIS — Z34.90 PREGNANCY, UNSPECIFIED GESTATIONAL AGE: Primary | ICD-10-CM

## 2025-03-12 DIAGNOSIS — O16.9 ELEVATED BLOOD PRESSURE AFFECTING PREGNANCY, ANTEPARTUM: ICD-10-CM

## 2025-03-12 PROCEDURE — 0502F SUBSEQUENT PRENATAL CARE: CPT | Performed by: ADVANCED PRACTICE MIDWIFE

## 2025-03-12 NOTE — PROGRESS NOTES
NATI:  Ralph Miranda is a 29 y.o.  at 39w5d who presents for routine OB appointment. Ralph usually sees Dr. Aldana.       BP (!) 143/94   Pulse 91   Wt 92.1 kg (203 lb)   LMP 2024   BMI 35.96 kg/m²     FHTs: 140  FH: 38    GBS: Neg    Cervical exam: 0/-2    Subjective: Doing well, no complaints. Good FM. Denies vaginal bleeding, Leaking of fluid, regular contractions.    Patient having symphysis pubis pain, reports that still from her fall, we discussed resting.  Patient would like to have a induction of labor will send message to JARET Chauhan to get her scheduled for next week.    Initial blood pressure elevated, repeat was within normal limits patient denies any pre-E symptoms, will get pre-E labs today    Will take BP BID record and bring in next week to review, call if > 140/90 or symptoms    Third trimester precautions given.  Labor precautions reviewed.     Follow up in 1 weeks.     TODD Rios - KATHY

## 2025-03-13 ENCOUNTER — HOSPITAL ENCOUNTER (EMERGENCY)
Facility: HOSPITAL | Age: 30
Discharge: HOME | End: 2025-03-13
Payer: COMMERCIAL

## 2025-03-13 ENCOUNTER — TELEPHONE (OUTPATIENT)
Age: 30
End: 2025-03-13

## 2025-03-13 VITALS
HEART RATE: 102 BPM | TEMPERATURE: 98.2 F | DIASTOLIC BLOOD PRESSURE: 79 MMHG | RESPIRATION RATE: 16 BRPM | SYSTOLIC BLOOD PRESSURE: 115 MMHG

## 2025-03-13 LAB
ALBUMIN SERPL-MCNC: 3.7 G/DL (ref 4–5)
ALP SERPL-CCNC: 149 IU/L (ref 44–121)
ALT SERPL-CCNC: 34 IU/L (ref 0–32)
AST SERPL-CCNC: 30 IU/L (ref 0–40)
BILIRUB SERPL-MCNC: <0.2 MG/DL (ref 0–1.2)
BUN SERPL-MCNC: 11 MG/DL (ref 6–20)
BUN/CREAT SERPL: 20 (ref 9–23)
CALCIUM SERPL-MCNC: 10 MG/DL (ref 8.7–10.2)
CHLORIDE SERPL-SCNC: 102 MMOL/L (ref 96–106)
CO2 SERPL-SCNC: 19 MMOL/L (ref 20–29)
CREAT SERPL-MCNC: 0.54 MG/DL (ref 0.57–1)
CREAT UR-MCNC: 163.8 MG/DL
EGFRCR SERPLBLD CKD-EPI 2021: 129 ML/MIN/1.73
ERYTHROCYTE [DISTWIDTH] IN BLOOD BY AUTOMATED COUNT: 13.1 % (ref 11.7–15.4)
GLOBULIN SER CALC-MCNC: 2.3 G/DL (ref 1.5–4.5)
GLUCOSE BLD STRIP.AUTO-MCNC: 91 MG/DL (ref 65–117)
GLUCOSE SERPL-MCNC: 86 MG/DL (ref 70–99)
HCT VFR BLD AUTO: 41.1 % (ref 34–46.6)
HGB BLD-MCNC: 13.6 G/DL (ref 11.1–15.9)
MCH RBC QN AUTO: 31.3 PG (ref 26.6–33)
MCHC RBC AUTO-ENTMCNC: 33.1 G/DL (ref 31.5–35.7)
MCV RBC AUTO: 95 FL (ref 79–97)
PLATELET # BLD AUTO: 210 X10E3/UL (ref 150–450)
POTASSIUM SERPL-SCNC: 4.4 MMOL/L (ref 3.5–5.2)
PROT SERPL-MCNC: 6 G/DL (ref 6–8.5)
PROT UR-MCNC: 30.7 MG/DL
PROT/CREAT UR: 187 MG/G CREAT (ref 0–200)
RBC # BLD AUTO: 4.35 X10E6/UL (ref 3.77–5.28)
SERVICE CMNT-IMP: NORMAL
SODIUM SERPL-SCNC: 135 MMOL/L (ref 134–144)
URATE SERPL-MCNC: 2.7 MG/DL (ref 2.6–6.2)
WBC # BLD AUTO: 9 X10E3/UL (ref 3.4–10.8)

## 2025-03-13 PROCEDURE — 82962 GLUCOSE BLOOD TEST: CPT

## 2025-03-13 PROCEDURE — 4500000002 HC ER NO CHARGE

## 2025-03-13 NOTE — ED TRIAGE NOTES
1101 - Notified Dr Ruiz by phone of pt's arrival and c/o blurred vision x 2 episodes this am, including \"difficulty forming words.\" She also c/o constant pubic pain \"ever since I fell\" a few weeks ago (for which she was hospitalized for 23 hours for monitoring), but denies contractions.    1108 - Bedside and Verbal shift change report given to SHERINE Huitron RNC (oncoming nurse) by BHARTI Barnes RNC (offgoing nurse). Report included the following information Nurse Handoff Report.

## 2025-03-13 NOTE — TELEPHONE ENCOUNTER
Called and spoke with pt in reference to her mychart message. Per felicia encouraged patient to be seen at [facility] for eval of sx. Pt verbalized understanding.

## 2025-03-13 NOTE — ED PROVIDER NOTES
History & Physical    Name: Ralph Miranad MRN: [id number]  SSN: [id number]    YOB: 1995  Age: 29 y.o.  Sex: female      Subjective:     Reason for Admission:  Pregnancy and Blurred Vision    History of Present Illness: Ralph Miranda is a 29 y.o.  female with an estimated gestational age of 39w6d with Estimated Date of Delivery: 3/14/25. Patient complains of  blurred vision earlier today  for 1 days. Pregnancy has been complicated by  none .  Patient denies right upper quadrant pain   and swelling.    OB History          1    Para        Term                AB        Living             SAB        IAB        Ectopic        Molar        Multiple        Live Births                  Past Medical History:   Diagnosis Date    Mental disorder      Past Surgical History:   Procedure Laterality Date    WISDOM TOOTH EXTRACTION       Social History     Occupational History    Not on file   Tobacco Use    Smoking status: Never    Smokeless tobacco: Never   Vaping Use    Vaping status: Never Used   Substance and Sexual Activity    Alcohol use: Not Currently     Alcohol/week: 4.0 standard drinks of alcohol     Types: 4 Cans of beer per week    Drug use: Never    Sexual activity: Yes     Partners: Male     Family History   Problem Relation Age of Onset    Diabetes Father     Diabetes Maternal Grandmother     Diabetes Paternal Grandmother     Diabetes Brother        Allergies   Allergen Reactions    Lactose      Pt is not allergic    Morphine Nausea Only    Oxycodone-Acetaminophen Nausea And Vomiting     Prior to Admission medications    Medication Sig Start Date End Date Taking? Authorizing Provider   ferrous fumarate-vitamin c (MARK-SEQUELS) 65-25 MG TBCR CR tablet Take 1 tablet by mouth daily (with breakfast)    Goyo Kapadia MD   Ferrous Sulfate (SLOW FE PO) Take by mouth    Goyo Kapadia MD   Ascorbic Acid (VITAMIN C) 1000 MG tablet Take 1 tablet by mouth daily

## 2025-03-14 ENCOUNTER — RESULTS FOLLOW-UP (OUTPATIENT)
Age: 30
End: 2025-03-14

## 2025-03-17 ENCOUNTER — ROUTINE PRENATAL (OUTPATIENT)
Age: 30
End: 2025-03-17

## 2025-03-17 VITALS
TEMPERATURE: 98.1 F | OXYGEN SATURATION: 97 % | HEIGHT: 63 IN | BODY MASS INDEX: 36.86 KG/M2 | HEART RATE: 88 BPM | SYSTOLIC BLOOD PRESSURE: 104 MMHG | WEIGHT: 208 LBS | DIASTOLIC BLOOD PRESSURE: 68 MMHG | RESPIRATION RATE: 20 BRPM

## 2025-03-17 DIAGNOSIS — Z34.90 PREGNANCY, UNSPECIFIED GESTATIONAL AGE: Primary | ICD-10-CM

## 2025-03-17 PROCEDURE — 0502F SUBSEQUENT PRENATAL CARE: CPT | Performed by: OBSTETRICS & GYNECOLOGY

## 2025-03-17 NOTE — PROGRESS NOTES
Routine prenatal visit today.  Normal fetal movements.  BP log from home reviewed - all values WNL.    Cervix still closed today.  Set up for IOL, arriving tomorrow at 4pm for cervical ripening.  Plan starting with cytotec.

## 2025-03-18 ENCOUNTER — HOSPITAL ENCOUNTER (INPATIENT)
Facility: HOSPITAL | Age: 30
LOS: 5 days | Discharge: HOME | End: 2025-03-23
Attending: OBSTETRICS & GYNECOLOGY | Admitting: OBSTETRICS & GYNECOLOGY
Payer: COMMERCIAL

## 2025-03-18 PROBLEM — Z34.90 ENCOUNTER FOR INDUCTION OF LABOR: Status: ACTIVE | Noted: 2025-03-18

## 2025-03-18 LAB
ABO + RH BLD: NORMAL
BLOOD GROUP ANTIBODIES SERPL: NORMAL
ERYTHROCYTE [DISTWIDTH] IN BLOOD BY AUTOMATED COUNT: 13.7 % (ref 11.5–14.5)
HCT VFR BLD AUTO: 36.3 % (ref 35–47)
HGB BLD-MCNC: 12.5 G/DL (ref 11.5–16)
MCH RBC QN AUTO: 32 PG (ref 26–34)
MCHC RBC AUTO-ENTMCNC: 34.4 G/DL (ref 30–36.5)
MCV RBC AUTO: 92.8 FL (ref 80–99)
NRBC # BLD: 0 K/UL (ref 0–0.01)
NRBC BLD-RTO: 0 PER 100 WBC
PLATELET # BLD AUTO: 200 K/UL (ref 150–400)
PMV BLD AUTO: 12.2 FL (ref 8.9–12.9)
RBC # BLD AUTO: 3.91 M/UL (ref 3.8–5.2)
RPR SER-TITR: ABNORMAL {TITER}
SPECIMEN EXP DATE BLD: NORMAL
WBC # BLD AUTO: 9.3 K/UL (ref 3.6–11)

## 2025-03-18 PROCEDURE — 86850 RBC ANTIBODY SCREEN: CPT

## 2025-03-18 PROCEDURE — 1100000000 HC RM PRIVATE

## 2025-03-18 PROCEDURE — 86592 SYPHILIS TEST NON-TREP QUAL: CPT

## 2025-03-18 PROCEDURE — 59200 INSERT CERVICAL DILATOR: CPT

## 2025-03-18 PROCEDURE — 86593 SYPHILIS TEST NON-TREP QUANT: CPT

## 2025-03-18 PROCEDURE — 3E0DXGC INTRODUCTION OF OTHER THERAPEUTIC SUBSTANCE INTO MOUTH AND PHARYNX, EXTERNAL APPROACH: ICD-10-PCS | Performed by: OBSTETRICS & GYNECOLOGY

## 2025-03-18 PROCEDURE — 85027 COMPLETE CBC AUTOMATED: CPT

## 2025-03-18 PROCEDURE — 86780 TREPONEMA PALLIDUM: CPT

## 2025-03-18 PROCEDURE — APPNB15 APP NON BILLABLE TIME 0-15 MINS: Performed by: ADVANCED PRACTICE MIDWIFE

## 2025-03-18 PROCEDURE — 86900 BLOOD TYPING SEROLOGIC ABO: CPT

## 2025-03-18 PROCEDURE — 86901 BLOOD TYPING SEROLOGIC RH(D): CPT

## 2025-03-18 PROCEDURE — 7210000100 HC LABOR FEE PER 1 HR

## 2025-03-18 PROCEDURE — 6370000000 HC RX 637 (ALT 250 FOR IP): Performed by: OBSTETRICS & GYNECOLOGY

## 2025-03-18 RX ORDER — METHYLERGONOVINE MALEATE 0.2 MG/ML
200 INJECTION INTRAVENOUS PRN
Status: DISCONTINUED | OUTPATIENT
Start: 2025-03-18 | End: 2025-03-23 | Stop reason: HOSPADM

## 2025-03-18 RX ORDER — NALBUPHINE HYDROCHLORIDE 10 MG/ML
10 INJECTION INTRAMUSCULAR; INTRAVENOUS; SUBCUTANEOUS EVERY 4 HOURS PRN
Status: DISCONTINUED | OUTPATIENT
Start: 2025-03-18 | End: 2025-03-23 | Stop reason: HOSPADM

## 2025-03-18 RX ORDER — ONDANSETRON 2 MG/ML
4 INJECTION INTRAMUSCULAR; INTRAVENOUS EVERY 6 HOURS PRN
Status: DISCONTINUED | OUTPATIENT
Start: 2025-03-18 | End: 2025-03-20 | Stop reason: SDUPTHER

## 2025-03-18 RX ORDER — DOCUSATE SODIUM 100 MG/1
100 CAPSULE, LIQUID FILLED ORAL 2 TIMES DAILY PRN
Status: DISCONTINUED | OUTPATIENT
Start: 2025-03-18 | End: 2025-03-20 | Stop reason: SDUPTHER

## 2025-03-18 RX ORDER — SODIUM CHLORIDE, SODIUM LACTATE, POTASSIUM CHLORIDE, CALCIUM CHLORIDE 600; 310; 30; 20 MG/100ML; MG/100ML; MG/100ML; MG/100ML
INJECTION, SOLUTION INTRAVENOUS CONTINUOUS
Status: DISCONTINUED | OUTPATIENT
Start: 2025-03-18 | End: 2025-03-20 | Stop reason: SDUPTHER

## 2025-03-18 RX ORDER — SODIUM CHLORIDE, SODIUM LACTATE, POTASSIUM CHLORIDE, AND CALCIUM CHLORIDE .6; .31; .03; .02 G/100ML; G/100ML; G/100ML; G/100ML
500 INJECTION, SOLUTION INTRAVENOUS PRN
Status: DISCONTINUED | OUTPATIENT
Start: 2025-03-18 | End: 2025-03-23 | Stop reason: HOSPADM

## 2025-03-18 RX ORDER — ACETAMINOPHEN 325 MG/1
650 TABLET ORAL EVERY 6 HOURS PRN
Status: DISCONTINUED | OUTPATIENT
Start: 2025-03-18 | End: 2025-03-20 | Stop reason: SDUPTHER

## 2025-03-18 RX ORDER — SODIUM CHLORIDE, SODIUM LACTATE, POTASSIUM CHLORIDE, AND CALCIUM CHLORIDE .6; .31; .03; .02 G/100ML; G/100ML; G/100ML; G/100ML
1000 INJECTION, SOLUTION INTRAVENOUS PRN
Status: DISCONTINUED | OUTPATIENT
Start: 2025-03-18 | End: 2025-03-23 | Stop reason: HOSPADM

## 2025-03-18 RX ORDER — TERBUTALINE SULFATE 1 MG/ML
0.25 INJECTION SUBCUTANEOUS
Status: COMPLETED | OUTPATIENT
Start: 2025-03-18 | End: 2025-03-19

## 2025-03-18 RX ORDER — CARBOPROST TROMETHAMINE 250 UG/ML
250 INJECTION, SOLUTION INTRAMUSCULAR PRN
Status: DISCONTINUED | OUTPATIENT
Start: 2025-03-18 | End: 2025-03-23 | Stop reason: HOSPADM

## 2025-03-18 RX ORDER — SODIUM CHLORIDE 0.9 % (FLUSH) 0.9 %
5-40 SYRINGE (ML) INJECTION EVERY 12 HOURS SCHEDULED
Status: DISCONTINUED | OUTPATIENT
Start: 2025-03-18 | End: 2025-03-20 | Stop reason: SDUPTHER

## 2025-03-18 RX ORDER — SODIUM CHLORIDE 9 MG/ML
INJECTION, SOLUTION INTRAVENOUS PRN
Status: DISCONTINUED | OUTPATIENT
Start: 2025-03-18 | End: 2025-03-20 | Stop reason: SDUPTHER

## 2025-03-18 RX ORDER — MISOPROSTOL 200 UG/1
400 TABLET ORAL PRN
Status: DISCONTINUED | OUTPATIENT
Start: 2025-03-18 | End: 2025-03-23 | Stop reason: HOSPADM

## 2025-03-18 RX ORDER — ONDANSETRON 4 MG/1
4 TABLET, ORALLY DISINTEGRATING ORAL EVERY 6 HOURS PRN
Status: DISCONTINUED | OUTPATIENT
Start: 2025-03-18 | End: 2025-03-20 | Stop reason: SDUPTHER

## 2025-03-18 RX ORDER — SODIUM CHLORIDE 0.9 % (FLUSH) 0.9 %
5-40 SYRINGE (ML) INJECTION PRN
Status: DISCONTINUED | OUTPATIENT
Start: 2025-03-18 | End: 2025-03-20 | Stop reason: SDUPTHER

## 2025-03-18 RX ADMIN — Medication 25 MCG: at 21:45

## 2025-03-18 RX ADMIN — Medication 25 MCG: at 17:23

## 2025-03-18 NOTE — H&P
Department of Obstetrics and Gynecology   Obstetrics History and Physical  H&P Admission Inpatient  Note        HISTORY OF PRESENT ILLNESS:      The patient is a 29 y.o. female at 40w4d who presents for an induction of labor for 40 weeks EGA, before 41 weeks per pt request.  Pregnancy has been overall uncomplicated, several EDIN visits.   See prenatal care details below.       PRENATAL CARE:  Patient Active Problem List    Diagnosis Date Noted    Fall at home, initial encounter 2025    Abdominal pain 2025    Pregnancy 2024     Primary Provider: Katya LANDEROS   EDC by 6 wk     Pregnancy problems:  Anxiety and depression:  not on meds currently, stable  L5 pinched disc: chronic pain in pregnancy  Right lateral ventricle mildly dilated 1.1cm at 28 weeks: MFM FU WNL    IOB labs:  O pos, normal, VZV NI--> PP vacc,  GC/Chl neg, urine cult neg  Genetic Screening: NIPT low risk BOY!! and carrier screening 4 panel neg  Anatomy: normal anatomy with MFM, anterior placenta  Flu: declines  TDAP: given    RSV: vaccine rx at 32 week, received vaccine 2/15  Third Tri Labs: 1 hr -->  3 hr GTT - only 1 high value, 11.8/35.5  GBS: neg    Pain mgmt. in labor: low intervention as possible, will try no epidural  Feeding: breast - has pump  Circ: desires  Social: patient is a  at [employer] and  Jv is a  at [employer]  Birth preferences: desires labor in water, delayed cord clamping when cord stops pulsating, low intervention        Depression with anxiety 2014        PAST OB HISTORY:  OB History          1    Para        Term                AB        Living             SAB        IAB        Ectopic        Molar        Multiple        Live Births                    Past Medical History:        Diagnosis Date    Mental disorder     anxiety/depression-no meds     Past Surgical History:        Procedure Laterality Date    WISDOM TOOTH EXTRACTION

## 2025-03-18 NOTE — PROGRESS NOTES
1945 Report received from ANGELICA Dorado Rn    2030 Pt is on [place]     3/19/25  0735 Bedside shift change report given to BHARTI Cortes RN (oncoming nurse) by NATHAN Yadav Rn (offgoing nurse). Report included the following information Nurse Handoff Report, Adult Overview, Intake/Output, MAR, Recent Results, and Med Rec Status.

## 2025-03-18 NOTE — PROGRESS NOTES
1600-Pt ambulated to room 9 for induction of labor.  G1.  States appropriate fetal movement.   and best friend at bedside.   1704-Dr Aldana at bedside, viewed strip. Vertex presentation confirmed by Vscan. Plan cytotec overnight.  Cervix closed yesterday in office.  1850-SHERINE Marquez CNM aware of RPR result, will draw Tpal.    1945-Bedside and Verbal shift change report given to NATHAN Yadav RN (oncoming nurse) by ANGELICA Dorado RN  (offgoing nurse). Report included the following information Nurse Handoff Report.

## 2025-03-18 NOTE — PROGRESS NOTES
Labor Progress Note  Patient seen, introduced self to pt, fetal heart rate and contraction pattern evaluated,   /72   Pulse (!) 103   Temp 98.1 °F (36.7 °C) (Oral)   Resp 14   Ht 1.6 m (5' 3\")   Wt 94.3 kg (208 lb)   LMP 05/24/2024   SpO2 98%   BMI 36.85 kg/m²     Physical Exam:  Fetal Heart Rate: Reactive    Assessment/Plan:  Reassuring fetal status, Continue plan for vaginal delivery    Continue plan per Dr. Katya RAHMAN, APRN - CNM

## 2025-03-19 ENCOUNTER — ANESTHESIA EVENT (OUTPATIENT)
Facility: HOSPITAL | Age: 30
End: 2025-03-19
Payer: COMMERCIAL

## 2025-03-19 ENCOUNTER — ANESTHESIA (OUTPATIENT)
Facility: HOSPITAL | Age: 30
End: 2025-03-19
Payer: COMMERCIAL

## 2025-03-19 PROCEDURE — 2500000003 HC RX 250 WO HCPCS: Performed by: ANESTHESIOLOGY

## 2025-03-19 PROCEDURE — 6360000002 HC RX W HCPCS: Performed by: ANESTHESIOLOGY

## 2025-03-19 PROCEDURE — 6360000002 HC RX W HCPCS

## 2025-03-19 PROCEDURE — 2580000003 HC RX 258: Performed by: ANESTHESIOLOGY

## 2025-03-19 PROCEDURE — APPNB45 APP NON BILLABLE 31-45 MINUTES

## 2025-03-19 PROCEDURE — 2580000003 HC RX 258: Performed by: OBSTETRICS & GYNECOLOGY

## 2025-03-19 PROCEDURE — 1100000000 HC RM PRIVATE

## 2025-03-19 PROCEDURE — 6360000002 HC RX W HCPCS: Performed by: OBSTETRICS & GYNECOLOGY

## 2025-03-19 PROCEDURE — 51702 INSERT TEMP BLADDER CATH: CPT

## 2025-03-19 PROCEDURE — 3700000025 EPIDURAL BLOCK: Performed by: SURGERY

## 2025-03-19 PROCEDURE — 6370000000 HC RX 637 (ALT 250 FOR IP): Performed by: OBSTETRICS & GYNECOLOGY

## 2025-03-19 PROCEDURE — 59200 INSERT CERVICAL DILATOR: CPT

## 2025-03-19 PROCEDURE — 7210000100 HC LABOR FEE PER 1 HR

## 2025-03-19 PROCEDURE — C1726 CATH, BAL DIL, NON-VASCULAR: HCPCS

## 2025-03-19 PROCEDURE — 99232 SBSQ HOSP IP/OBS MODERATE 35: CPT | Performed by: OBSTETRICS & GYNECOLOGY

## 2025-03-19 RX ORDER — FENTANYL CITRATE 50 UG/ML
INJECTION, SOLUTION INTRAMUSCULAR; INTRAVENOUS
Status: COMPLETED
Start: 2025-03-19 | End: 2025-03-19

## 2025-03-19 RX ORDER — TERBUTALINE SULFATE 1 MG/ML
INJECTION SUBCUTANEOUS
Status: COMPLETED
Start: 2025-03-19 | End: 2025-03-19

## 2025-03-19 RX ORDER — DIPHENHYDRAMINE HYDROCHLORIDE 50 MG/ML
INJECTION, SOLUTION INTRAMUSCULAR; INTRAVENOUS
Status: COMPLETED
Start: 2025-03-19 | End: 2025-03-19

## 2025-03-19 RX ORDER — FENTANYL CITRATE 50 UG/ML
INJECTION, SOLUTION INTRAMUSCULAR; INTRAVENOUS
Status: DISCONTINUED | OUTPATIENT
Start: 2025-03-19 | End: 2025-03-20 | Stop reason: SDUPTHER

## 2025-03-19 RX ORDER — NALOXONE HYDROCHLORIDE 0.4 MG/ML
INJECTION, SOLUTION INTRAMUSCULAR; INTRAVENOUS; SUBCUTANEOUS PRN
Status: DISCONTINUED | OUTPATIENT
Start: 2025-03-19 | End: 2025-03-20 | Stop reason: HOSPADM

## 2025-03-19 RX ORDER — ONDANSETRON 2 MG/ML
4 INJECTION INTRAMUSCULAR; INTRAVENOUS EVERY 6 HOURS PRN
Status: DISCONTINUED | OUTPATIENT
Start: 2025-03-19 | End: 2025-03-20 | Stop reason: HOSPADM

## 2025-03-19 RX ORDER — FENTANYL CITRATE 50 UG/ML
100 INJECTION, SOLUTION INTRAMUSCULAR; INTRAVENOUS
Status: DISCONTINUED | OUTPATIENT
Start: 2025-03-19 | End: 2025-03-20 | Stop reason: SDUPTHER

## 2025-03-19 RX ORDER — BUPIVACAINE HYDROCHLORIDE 2.5 MG/ML
INJECTION, SOLUTION EPIDURAL; INFILTRATION; INTRACAUDAL; PERINEURAL
Status: COMPLETED
Start: 2025-03-19 | End: 2025-03-19

## 2025-03-19 RX ORDER — DIPHENHYDRAMINE HCL 12.5 MG/5ML
12.5 SOLUTION ORAL EVERY 6 HOURS PRN
Status: DISCONTINUED | OUTPATIENT
Start: 2025-03-19 | End: 2025-03-19

## 2025-03-19 RX ORDER — FENTANYL/BUPIVACAINE/NS/PF 2-1250MCG
1-15 PLASTIC BAG, INJECTION (ML) INJECTION CONTINUOUS
Refills: 0 | Status: DISCONTINUED | OUTPATIENT
Start: 2025-03-19 | End: 2025-03-20 | Stop reason: HOSPADM

## 2025-03-19 RX ORDER — BUPIVACAINE HYDROCHLORIDE 2.5 MG/ML
INJECTION, SOLUTION EPIDURAL; INFILTRATION; INTRACAUDAL; PERINEURAL
Status: DISCONTINUED | OUTPATIENT
Start: 2025-03-19 | End: 2025-03-20 | Stop reason: SDUPTHER

## 2025-03-19 RX ORDER — EPHEDRINE SULFATE 50 MG/ML
5 INJECTION INTRAVENOUS PRN
Status: DISCONTINUED | OUTPATIENT
Start: 2025-03-20 | End: 2025-03-20 | Stop reason: HOSPADM

## 2025-03-19 RX ORDER — DIPHENHYDRAMINE HYDROCHLORIDE 50 MG/ML
12.5 INJECTION, SOLUTION INTRAMUSCULAR; INTRAVENOUS EVERY 6 HOURS PRN
Status: DISCONTINUED | OUTPATIENT
Start: 2025-03-19 | End: 2025-03-23 | Stop reason: HOSPADM

## 2025-03-19 RX ORDER — METOCLOPRAMIDE HYDROCHLORIDE 5 MG/ML
10 INJECTION INTRAMUSCULAR; INTRAVENOUS EVERY 6 HOURS
Status: DISCONTINUED | OUTPATIENT
Start: 2025-03-19 | End: 2025-03-21

## 2025-03-19 RX ORDER — NALBUPHINE HYDROCHLORIDE 10 MG/ML
5 INJECTION INTRAMUSCULAR; INTRAVENOUS; SUBCUTANEOUS EVERY 4 HOURS PRN
Status: DISCONTINUED | OUTPATIENT
Start: 2025-03-19 | End: 2025-03-20 | Stop reason: HOSPADM

## 2025-03-19 RX ORDER — EPHEDRINE SULFATE 50 MG/ML
10 INJECTION INTRAVENOUS
Status: COMPLETED | OUTPATIENT
Start: 2025-03-19 | End: 2025-03-19

## 2025-03-19 RX ADMIN — DIPHENHYDRAMINE HYDROCHLORIDE 12.5 MG: 50 INJECTION INTRAMUSCULAR; INTRAVENOUS at 14:11

## 2025-03-19 RX ADMIN — FENTANYL CITRATE 100 MCG: 50 INJECTION INTRAMUSCULAR; INTRAVENOUS at 17:38

## 2025-03-19 RX ADMIN — BUPIVACAINE HYDROCHLORIDE 3 ML: 2.5 INJECTION, SOLUTION EPIDURAL; INFILTRATION; INTRACAUDAL; PERINEURAL at 18:38

## 2025-03-19 RX ADMIN — METOCLOPRAMIDE 10 MG: 5 INJECTION, SOLUTION INTRAMUSCULAR; INTRAVENOUS at 14:12

## 2025-03-19 RX ADMIN — BUPIVACAINE HYDROCHLORIDE 10 ML/HR: 5 INJECTION, SOLUTION EPIDURAL; INTRACAUDAL; PERINEURAL at 18:59

## 2025-03-19 RX ADMIN — Medication 50 MCG: at 10:32

## 2025-03-19 RX ADMIN — ONDANSETRON 4 MG: 2 INJECTION, SOLUTION INTRAMUSCULAR; INTRAVENOUS at 12:27

## 2025-03-19 RX ADMIN — NALBUPHINE HYDROCHLORIDE 10 MG: 10 INJECTION, SOLUTION INTRAMUSCULAR; INTRAVENOUS; SUBCUTANEOUS at 16:01

## 2025-03-19 RX ADMIN — SODIUM CHLORIDE, SODIUM LACTATE, POTASSIUM CHLORIDE, AND CALCIUM CHLORIDE 1000 ML: .6; .31; .03; .02 INJECTION, SOLUTION INTRAVENOUS at 17:47

## 2025-03-19 RX ADMIN — SODIUM CHLORIDE, SODIUM LACTATE, POTASSIUM CHLORIDE, AND CALCIUM CHLORIDE 1000 ML: .6; .31; .03; .02 INJECTION, SOLUTION INTRAVENOUS at 18:31

## 2025-03-19 RX ADMIN — Medication 25 MCG: at 01:26

## 2025-03-19 RX ADMIN — EPHEDRINE SULFATE 10 MG: 50 INJECTION INTRAVENOUS at 19:41

## 2025-03-19 RX ADMIN — ACETAMINOPHEN 650 MG: 325 TABLET ORAL at 10:04

## 2025-03-19 RX ADMIN — SODIUM CHLORIDE, SODIUM LACTATE, POTASSIUM CHLORIDE, AND CALCIUM CHLORIDE: .6; .31; .03; .02 INJECTION, SOLUTION INTRAVENOUS at 20:56

## 2025-03-19 RX ADMIN — SODIUM CHLORIDE, SODIUM LACTATE, POTASSIUM CHLORIDE, AND CALCIUM CHLORIDE 500 ML: .6; .31; .03; .02 INJECTION, SOLUTION INTRAVENOUS at 19:56

## 2025-03-19 RX ADMIN — BUPIVACAINE HYDROCHLORIDE 2 ML: 2.5 INJECTION, SOLUTION EPIDURAL; INFILTRATION; INTRACAUDAL; PERINEURAL at 18:34

## 2025-03-19 RX ADMIN — TERBUTALINE SULFATE 0.25 MG: 1 INJECTION SUBCUTANEOUS at 21:36

## 2025-03-19 RX ADMIN — Medication 25 MCG: at 05:23

## 2025-03-19 RX ADMIN — EPHEDRINE SULFATE 5 MG: 50 INJECTION INTRAVENOUS at 20:03

## 2025-03-19 RX ADMIN — NALBUPHINE HYDROCHLORIDE 10 MG: 10 INJECTION, SOLUTION INTRAMUSCULAR; INTRAVENOUS; SUBCUTANEOUS at 12:29

## 2025-03-19 RX ADMIN — FENTANYL CITRATE 100 MCG: 50 INJECTION, SOLUTION INTRAMUSCULAR; INTRAVENOUS at 17:38

## 2025-03-19 RX ADMIN — Medication 50 MCG: at 14:29

## 2025-03-19 RX ADMIN — FENTANYL CITRATE 100 MCG: 50 INJECTION INTRAMUSCULAR; INTRAVENOUS at 18:36

## 2025-03-19 RX ADMIN — TERBUTALINE SULFATE 0.25 MG: 1 INJECTION, SOLUTION SUBCUTANEOUS at 21:36

## 2025-03-19 RX ADMIN — BUPIVACAINE HYDROCHLORIDE 3 ML: 2.5 INJECTION, SOLUTION EPIDURAL; INFILTRATION; INTRACAUDAL; PERINEURAL at 18:42

## 2025-03-19 RX ADMIN — SODIUM CHLORIDE, SODIUM LACTATE, POTASSIUM CHLORIDE, AND CALCIUM CHLORIDE: .6; .31; .03; .02 INJECTION, SOLUTION INTRAVENOUS at 22:34

## 2025-03-19 NOTE — PROGRESS NOTES
Talked to pt about syphilis test result from admission.  T pal testing had returned negative x 2 in pregnancy. RPR testing was not performed.     RPR on admission was positive and titers 1:2  T pal confirmation pending.    I discussed this result with the patient this morning, she was unaware. She denies a prior history of syphilis. Does not have a current rash or genital ulcer.     I have spoken to the health department who will get back to me with guidance.     Natasha Aldana MD

## 2025-03-19 NOTE — PROGRESS NOTES
0735: Bedside and Verbal shift change report given to BHARTI Cortes RN (oncoming nurse) by ADAM Yadav RN (offgoing nurse). Report included the following information Nurse Handoff Report, Adult Overview, MAR, Recent Results, and Event Log.    0815: Dr. Aldana at the bedside to assess pt progress and discuss plan of care. SVE closed/30/-3.     0930: RN applying counter pressure/educating family in room how to apply pressure for pain relief. Pt coping/verbal during contractions. Pt denies IV pain medication.    1004: Tylenol 650 mg given (see MAR).    1025: Pt asking questions about epidural. RN and charge RN discussed options. Pt desires to wait until she is in more active labor at this time.     1032: Cytotec 50 mcg given, 5th dose (see MAR). Pt agrees to this plan of care.    1227: Zofran given (see MAR).    1229: Nubain given (see MAR).    1232: Dr. Aldana at the bedside to check in w/ pt. Pt comfortable after nubain admin. Resting in the bed.    1310: Bedside and Verbal shift change report given to TEGAN Joyce RN (oncoming nurse) by BHARTI Cortes RN (offgoing nurse). Report included the following information Nurse Handoff Report, Adult Overview, MAR, Recent Results, and Event Log.

## 2025-03-19 NOTE — ANESTHESIA PRE PROCEDURE
Department of Anesthesiology  Preprocedure Note       Name:  Ralph Miranda   Age:  29 y.o.  :  1995                                          MRN:  [id number]         Date:  3/19/2025      Surgeon: * No surgeons listed *    Procedure: * No procedures listed *    Medications prior to admission:   Prior to Admission medications    Medication Sig Start Date End Date Taking? Authorizing Provider   Ascorbic Acid (VITAMIN C) 1000 MG tablet Take 1 tablet by mouth daily   Yes Goyo Kapadia MD   Prenatal Vit-Fe Fumarate-FA (PRENATAL PO) Take by mouth   Yes Goyo Kapadia MD   magnesium gluconate (MAGONATE) 500 MG tablet Take 0.5 tablets by mouth daily   Yes Goyo Kapadia MD       Current medications:    Current Facility-Administered Medications   Medication Dose Route Frequency Provider Last Rate Last Admin   • metoclopramide (REGLAN) injection 10 mg  10 mg IntraVENous Q6H Natasha Aldana MD   10 mg at 25 1412   • diphenhydrAMINE (BENADRYL) injection 12.5 mg  12.5 mg IntraVENous Q6H PRN Natasha Aldana MD       • fentaNYL (SUBLIMAZE) injection 100 mcg  100 mcg IntraVENous Q1H PRN Natasha Aldana MD   100 mcg at 25 1738   • lactated ringers infusion   IntraVENous Continuous Natasha Aldana  mL/hr at 25 1300 Rate Change at 25 1300   • lactated ringers bolus 500 mL  500 mL IntraVENous PRN Natasha Aldana MD        Or   • lactated ringers bolus 1,000 mL  1,000 mL IntraVENous PRN Natasha Aldana .9 mL/hr at 25 1747 1,000 mL at 25 1747   • sodium chloride flush 0.9 % injection 5-40 mL  5-40 mL IntraVENous 2 times per day Natasha Aldana MD       • sodium chloride flush 0.9 % injection 5-40 mL  5-40 mL IntraVENous PRN Natasha Aldana MD       • 0.9 % sodium chloride infusion   IntraVENous PRN Natasha Aldana MD       • methylergonovine (METHERGINE) injection 200 mcg  200 mcg IntraMUSCular PRN Natasha Aldana MD       • carboprost (HEMABATE) injection 250

## 2025-03-19 NOTE — ANESTHESIA PROCEDURE NOTES
Epidural Block    Patient location during procedure: OB  Start time: 3/19/2025 6:30 PM  End time: 3/19/2025 6:44 PM  Reason for block: labor epidural  Staffing  Performed: anesthesiologist   Anesthesiologist: Jose Mustafa MD  Performed by: Jose Mustafa MD  Authorized by: Jose Mustafa MD    Epidural  Patient position: sitting  Prep: DuraPrep  Patient monitoring: cardiac monitor, continuous pulse ox and frequent blood pressure checks  Approach: midline  Location: L2-3  Injection technique: JOEY saline  Provider prep: mask and sterile gloves  Needle  Needle type: Tuohy   Needle gauge: 17 G  Needle length: 3.5 in  Needle insertion depth: 7 cm  Catheter type: end hole  Catheter size: 18 G  Catheter at skin depth: 12 cmCatheter Secured: tegaderm and tape  Assessment  Sensory level: T6  Events: None  Hemodynamics: stable  Attempts: 1  Outcomes: uncomplicated and patient tolerated procedure well  Preanesthetic Checklist  Completed: patient identified, IV checked, site marked, risks and benefits discussed, surgical/procedural consents, equipment checked, pre-op evaluation, timeout performed, anesthesia consent given, oxygen available, monitors applied/VS acknowledged and fire risk safety assessment completed and verbalized

## 2025-03-19 NOTE — PROGRESS NOTES
Labor Progress Note    Patient: Ralph Miranda YOB: 1995  Age: 29 y.o.     Subjective:     Ralph did well overnight with no major issues. She reports some cramping and hip pain.  Has not felt any painful contractions. Her  Jv and brayan Almeida.    Objective:     Vital Signs:  /60   Pulse 79   Temp 98.2 °F (36.8 °C) (Oral)   Resp 16   Ht 1.6 m (5' 3\")   Wt 94.3 kg (208 lb)   LMP 05/24/2024   SpO2 96%   BMI 36.85 kg/m²      Cervical Exam:    Closed  /30-3 - cervix softer than prior exam    Fhr cat 1, reactive  [place] - irreg ctx     Lab/Data Review:  Recent Results (from the past 24 hours)   CBC    Collection Time: 03/18/25  4:42 PM   Result Value Ref Range    WBC 9.3 3.6 - 11.0 K/uL    RBC 3.91 3.80 - 5.20 M/uL    Hemoglobin 12.5 11.5 - 16.0 g/dL    Hematocrit 36.3 35.0 - 47.0 %    MCV 92.8 80.0 - 99.0 FL    MCH 32.0 26.0 - 34.0 PG    MCHC 34.4 30.0 - 36.5 g/dL    RDW 13.7 11.5 - 14.5 %    Platelets 200 150 - 400 K/uL    MPV 12.2 8.9 - 12.9 FL    Nucleated RBCs 0.0 0  WBC    nRBC 0.00 0.00 - 0.01 K/uL   RPR    Collection Time: 03/18/25  4:42 PM   Result Value Ref Range    RPR REACTIVE (A) NR     RPR Titer    Collection Time: 03/18/25  4:42 PM   Result Value Ref Range    RPR TITER 1:2 (A) NR   TYPE AND SCREEN    Collection Time: 03/18/25  4:44 PM   Result Value Ref Range    Crossmatch expiration date 03/21/2025,4642     ABO/Rh O POSITIVE     Antibody Screen NEG        Assessment/Plan:     Principal Problem:    Encounter for induction of labor  Resolved Problems:    * No resolved hospital problems. *    IOL for (nearly) late term. GBS neg. Cephalic.   - continue cervical ripening efforts, she received 4 doses of buccal cytotec 25 mcg overnight, increase next two doses to 50 mcg  - reassess later today for hopeful cook catheter placement    Signed By: Natasha Aldana MD     March 19, 2025

## 2025-03-19 NOTE — PROGRESS NOTES
Pt seen around 1245, she had just recently received IV nubain and IV zofran for nausea and contraction pain. Family at the bedside. Her contractions have already improved since receiving the medication.    One more dose of cytotec and then will reassess cervix.     FHR cat 1, reactive  Ctx every 3-6 mins      I have also spoken with the medical director at the health department. They agree with tpal confirmatory testing and also recommend RPR from baby and thorough exam of baby after delivery.  If tpal positive or if baby's RPR is reactive, then true new infection in the third trimester has occurred.   If tpal neg and baby RPR NR, then suspect immunologic false positive of maternal RPR.   Will defer possible bicillin 2.4mu IM treatment of mother until after delivery due to risk of treponemal reaction.     Natasha Aldana MD

## 2025-03-19 NOTE — PROGRESS NOTES
Labor Progress Note    Patient: Ralph Miranda YOB: 1995  Age: 29 y.o.     Subjective:     Ralph is feeling painful contractions. Received 2 doses of nubain today. Her mother and best friend are at the bedside.     Objective:     Vital Signs:  /62   Pulse 69   Temp 98.2 °F (36.8 °C)   Resp 15   Ht 1.6 m (5' 3\")   Wt 94.3 kg (208 lb)   LMP 2024   SpO2 99%   BMI 36.85 kg/m²      Cervical Exam:    /-2    FHR cat 1, reactive  [place] ctx every 4-5 mins    Lab/Data Review:  No results found for this or any previous visit (from the past 24 hours).    Assessment/Plan:     Principal Problem:    Encounter for induction of labor  Resolved Problems:    * No resolved hospital problems. *    IOL for late term. GBS neg.   - s/p 6 doses of buccal cytotec  - cervix now 1cm/80% which is a huge change from earlier. Recommended cook catheter placement and she agrees. Cook placed manually with ease, see procedure note below.   - start pitocin 4 hours after last cytotec dose  - epidural as desired    Pos RPR with 1:2 titer, t pal pending. 2 prior t pals neg in pregnancy. Possible immunologic false positive.   - awaiting t pal result  - inform peds after delivery (already talked to NICU) for exam and RPR of baby     Overnight labor management signed out to KATHY Zamudio.     Signed By: Natasha Aldana MD     2025          Cervical Catheter insertion procedure note    Ralph Miranda is a ,  29 y.o. female who presents today far placement of a cervical catheter in the cervix for ripening. Her cervix is unfavorable and she is scheduled for induction tomorrow.   She has elected to have a cervical catheter placement today. The risks, benefits and assets of the procedure were discussed. Her questions were answered.     PROCEDURE: A Cook catheter was placed through the cervix via manual exam without difficulty. The uterine bulb was inflated with 60 cc of saline. The cervical balloon was inflated to

## 2025-03-19 NOTE — PROGRESS NOTES
1300 Pt lying in bed with family at bedside. Pt states pain is better since medicine but she is still feeling contractions and having some nausea. Quease-ease and ice pack given. VSS. IVF bolus complete, fluids returned to 125/h.    1420 Pt still c/o nausea. Orders received from Dr Aldana for reglan/benadryl. Meds given. Pt resting in between contractions, lying in bed. Pt states she's comfortable and not interested in changing positions at this time.    1430 6th dose cytotec given.    1500 Pt states nausea has subsided. Pt having pain with contractions but coping well. Family at bedside.     1600 2nd dose nubain given.    1730 Dr Aldana at bedside to evaluate patient. SVE 1/80/-2. Plan to place CRB. Fentanyl given. CRB placed easily, pt tolerated well.    1815 Pt sitting up for epidural.    1840 Epidural placed by Dr Mustafa, pt tolerated well.    1930 Bedside report given to SKY Clark and ROSENDO Da Silva RN

## 2025-03-20 LAB
BASOPHILS # BLD: 0.01 K/UL (ref 0–0.1)
BASOPHILS NFR BLD: 0.1 % (ref 0–1)
DIFFERENTIAL METHOD BLD: ABNORMAL
EOSINOPHIL # BLD: 0 K/UL (ref 0–0.4)
EOSINOPHIL NFR BLD: 0 % (ref 0–7)
ERYTHROCYTE [DISTWIDTH] IN BLOOD BY AUTOMATED COUNT: 14 % (ref 11.5–14.5)
HCT VFR BLD AUTO: 29.4 % (ref 35–47)
HGB BLD-MCNC: 10 G/DL (ref 11.5–16)
IMM GRANULOCYTES # BLD AUTO: 0.07 K/UL (ref 0–0.04)
IMM GRANULOCYTES NFR BLD AUTO: 0.5 % (ref 0–0.5)
LYMPHOCYTES # BLD: 1.03 K/UL (ref 0.8–3.5)
LYMPHOCYTES NFR BLD: 7.1 % (ref 12–49)
MCH RBC QN AUTO: 31.8 PG (ref 26–34)
MCHC RBC AUTO-ENTMCNC: 34 G/DL (ref 30–36.5)
MCV RBC AUTO: 93.6 FL (ref 80–99)
MONOCYTES # BLD: 1.01 K/UL (ref 0–1)
MONOCYTES NFR BLD: 7 % (ref 5–13)
NEUTS SEG # BLD: 12.3 K/UL (ref 1.8–8)
NEUTS SEG NFR BLD: 85.3 % (ref 32–75)
NRBC # BLD: 0 K/UL (ref 0–0.01)
NRBC BLD-RTO: 0 PER 100 WBC
PLATELET # BLD AUTO: 143 K/UL (ref 150–400)
PMV BLD AUTO: 12.3 FL (ref 8.9–12.9)
RBC # BLD AUTO: 3.14 M/UL (ref 3.8–5.2)
RPR SER QL: REACTIVE
T PALLIDUM AB SER QL IA: NON REACTIVE
T PALLIDUM AB SER QL IA: NON REACTIVE
WBC # BLD AUTO: 14.4 K/UL (ref 3.6–11)

## 2025-03-20 PROCEDURE — 2580000003 HC RX 258: Performed by: OBSTETRICS & GYNECOLOGY

## 2025-03-20 PROCEDURE — 6370000000 HC RX 637 (ALT 250 FOR IP): Performed by: OBSTETRICS & GYNECOLOGY

## 2025-03-20 PROCEDURE — 6360000002 HC RX W HCPCS: Performed by: NURSE ANESTHETIST, CERTIFIED REGISTERED

## 2025-03-20 PROCEDURE — 2709999900 HC NON-CHARGEABLE SUPPLY: Performed by: OBSTETRICS & GYNECOLOGY

## 2025-03-20 PROCEDURE — 6360000002 HC RX W HCPCS: Performed by: OBSTETRICS & GYNECOLOGY

## 2025-03-20 PROCEDURE — 2500000003 HC RX 250 WO HCPCS: Performed by: SURGERY

## 2025-03-20 PROCEDURE — APPNB15 APP NON BILLABLE TIME 0-15 MINS

## 2025-03-20 PROCEDURE — 3700000001 HC ADD 15 MINUTES (ANESTHESIA): Performed by: OBSTETRICS & GYNECOLOGY

## 2025-03-20 PROCEDURE — 7100000001 HC PACU RECOVERY - ADDTL 15 MIN: Performed by: OBSTETRICS & GYNECOLOGY

## 2025-03-20 PROCEDURE — 3700000000 HC ANESTHESIA ATTENDED CARE: Performed by: OBSTETRICS & GYNECOLOGY

## 2025-03-20 PROCEDURE — 6360000002 HC RX W HCPCS: Performed by: ANESTHESIOLOGY

## 2025-03-20 PROCEDURE — 2580000003 HC RX 258: Performed by: SURGERY

## 2025-03-20 PROCEDURE — 7210000100 HC LABOR FEE PER 1 HR

## 2025-03-20 PROCEDURE — 7100000000 HC PACU RECOVERY - FIRST 15 MIN: Performed by: OBSTETRICS & GYNECOLOGY

## 2025-03-20 PROCEDURE — 1120000000 HC RM PRIVATE OB

## 2025-03-20 PROCEDURE — 2500000003 HC RX 250 WO HCPCS: Performed by: OBSTETRICS & GYNECOLOGY

## 2025-03-20 PROCEDURE — 6370000000 HC RX 637 (ALT 250 FOR IP): Performed by: ADVANCED PRACTICE MIDWIFE

## 2025-03-20 PROCEDURE — 85025 COMPLETE CBC W/AUTO DIFF WBC: CPT

## 2025-03-20 PROCEDURE — 6360000002 HC RX W HCPCS: Performed by: SURGERY

## 2025-03-20 PROCEDURE — APPNB30 APP NON BILLABLE TIME 0-30 MINS

## 2025-03-20 PROCEDURE — 3609079900 HC CESAREAN SECTION: Performed by: OBSTETRICS & GYNECOLOGY

## 2025-03-20 RX ORDER — SODIUM CHLORIDE 9 MG/ML
INJECTION, SOLUTION INTRAVENOUS PRN
Status: DISCONTINUED | OUTPATIENT
Start: 2025-03-20 | End: 2025-03-23 | Stop reason: HOSPADM

## 2025-03-20 RX ORDER — IBUPROFEN 400 MG/1
800 TABLET, FILM COATED ORAL EVERY 8 HOURS PRN
Status: DISCONTINUED | OUTPATIENT
Start: 2025-03-20 | End: 2025-03-23 | Stop reason: HOSPADM

## 2025-03-20 RX ORDER — ONDANSETRON 4 MG/1
4 TABLET, ORALLY DISINTEGRATING ORAL EVERY 8 HOURS PRN
Status: DISCONTINUED | OUTPATIENT
Start: 2025-03-20 | End: 2025-03-23 | Stop reason: HOSPADM

## 2025-03-20 RX ORDER — PHENYLEPHRINE HYDROCHLORIDE 10 MG/ML
INJECTION INTRAVENOUS
Status: DISCONTINUED | OUTPATIENT
Start: 2025-03-20 | End: 2025-03-20 | Stop reason: SDUPTHER

## 2025-03-20 RX ORDER — ACETAMINOPHEN 325 MG/1
975 TABLET ORAL ONCE
Status: COMPLETED | OUTPATIENT
Start: 2025-03-20 | End: 2025-03-20

## 2025-03-20 RX ORDER — SODIUM CHLORIDE, SODIUM LACTATE, POTASSIUM CHLORIDE, CALCIUM CHLORIDE 600; 310; 30; 20 MG/100ML; MG/100ML; MG/100ML; MG/100ML
INJECTION, SOLUTION INTRAVENOUS CONTINUOUS
Status: DISCONTINUED | OUTPATIENT
Start: 2025-03-20 | End: 2025-03-23 | Stop reason: HOSPADM

## 2025-03-20 RX ORDER — FENTANYL CITRATE 50 UG/ML
INJECTION, SOLUTION INTRAMUSCULAR; INTRAVENOUS
Status: COMPLETED
Start: 2025-03-20 | End: 2025-03-20

## 2025-03-20 RX ORDER — SODIUM CHLORIDE 0.9 % (FLUSH) 0.9 %
5-40 SYRINGE (ML) INJECTION PRN
OUTPATIENT
Start: 2025-03-20

## 2025-03-20 RX ORDER — DEXAMETHASONE SODIUM PHOSPHATE 4 MG/ML
INJECTION, SOLUTION INTRA-ARTICULAR; INTRALESIONAL; INTRAMUSCULAR; INTRAVENOUS; SOFT TISSUE
Status: DISCONTINUED | OUTPATIENT
Start: 2025-03-20 | End: 2025-03-20 | Stop reason: SDUPTHER

## 2025-03-20 RX ORDER — SIMETHICONE 80 MG
80 TABLET,CHEWABLE ORAL EVERY 6 HOURS PRN
Status: DISCONTINUED | OUTPATIENT
Start: 2025-03-20 | End: 2025-03-23 | Stop reason: HOSPADM

## 2025-03-20 RX ORDER — SODIUM CHLORIDE 0.9 % (FLUSH) 0.9 %
5-40 SYRINGE (ML) INJECTION EVERY 12 HOURS SCHEDULED
Status: DISCONTINUED | OUTPATIENT
Start: 2025-03-20 | End: 2025-03-23 | Stop reason: HOSPADM

## 2025-03-20 RX ORDER — PROCHLORPERAZINE EDISYLATE 5 MG/ML
5 INJECTION INTRAMUSCULAR; INTRAVENOUS
OUTPATIENT
Start: 2025-03-20

## 2025-03-20 RX ORDER — TRANEXAMIC ACID 100 MG/ML
INJECTION, SOLUTION INTRAVENOUS
Status: DISCONTINUED | OUTPATIENT
Start: 2025-03-20 | End: 2025-03-20 | Stop reason: SDUPTHER

## 2025-03-20 RX ORDER — OXYTOCIN/RINGER'S LACTATE 30/500 ML
PLASTIC BAG, INJECTION (ML) INTRAVENOUS
Status: DISCONTINUED | OUTPATIENT
Start: 2025-03-20 | End: 2025-03-20 | Stop reason: SDUPTHER

## 2025-03-20 RX ORDER — IBUPROFEN 400 MG/1
800 TABLET, FILM COATED ORAL EVERY 8 HOURS
Status: DISCONTINUED | OUTPATIENT
Start: 2025-03-21 | End: 2025-03-20

## 2025-03-20 RX ORDER — MODIFIED LANOLIN
OINTMENT (GRAM) TOPICAL
Status: DISCONTINUED | OUTPATIENT
Start: 2025-03-20 | End: 2025-03-23 | Stop reason: HOSPADM

## 2025-03-20 RX ORDER — OXYCODONE HYDROCHLORIDE 5 MG/1
10 TABLET ORAL EVERY 4 HOURS PRN
Status: DISCONTINUED | OUTPATIENT
Start: 2025-03-20 | End: 2025-03-23 | Stop reason: HOSPADM

## 2025-03-20 RX ORDER — ONDANSETRON 2 MG/ML
INJECTION INTRAMUSCULAR; INTRAVENOUS
Status: DISCONTINUED | OUTPATIENT
Start: 2025-03-20 | End: 2025-03-20 | Stop reason: SDUPTHER

## 2025-03-20 RX ORDER — FENTANYL CITRATE 50 UG/ML
25 INJECTION, SOLUTION INTRAMUSCULAR; INTRAVENOUS EVERY 5 MIN PRN
Refills: 0 | OUTPATIENT
Start: 2025-03-20

## 2025-03-20 RX ORDER — ONDANSETRON 2 MG/ML
4 INJECTION INTRAMUSCULAR; INTRAVENOUS
OUTPATIENT
Start: 2025-03-20

## 2025-03-20 RX ORDER — ACETAMINOPHEN 500 MG
1000 TABLET ORAL EVERY 8 HOURS SCHEDULED
Status: DISCONTINUED | OUTPATIENT
Start: 2025-03-20 | End: 2025-03-23 | Stop reason: HOSPADM

## 2025-03-20 RX ORDER — DOCUSATE SODIUM 100 MG/1
100 CAPSULE, LIQUID FILLED ORAL 2 TIMES DAILY
Status: DISCONTINUED | OUTPATIENT
Start: 2025-03-20 | End: 2025-03-23 | Stop reason: HOSPADM

## 2025-03-20 RX ORDER — NALOXONE HYDROCHLORIDE 0.4 MG/ML
INJECTION, SOLUTION INTRAMUSCULAR; INTRAVENOUS; SUBCUTANEOUS PRN
OUTPATIENT
Start: 2025-03-20

## 2025-03-20 RX ORDER — HYDROMORPHONE HYDROCHLORIDE 1 MG/ML
0.5 INJECTION, SOLUTION INTRAMUSCULAR; INTRAVENOUS; SUBCUTANEOUS EVERY 5 MIN PRN
Refills: 0 | OUTPATIENT
Start: 2025-03-20

## 2025-03-20 RX ORDER — OXYCODONE HYDROCHLORIDE 5 MG/1
5 TABLET ORAL EVERY 4 HOURS PRN
Status: DISCONTINUED | OUTPATIENT
Start: 2025-03-20 | End: 2025-03-23 | Stop reason: HOSPADM

## 2025-03-20 RX ORDER — ONDANSETRON 2 MG/ML
4 INJECTION INTRAMUSCULAR; INTRAVENOUS EVERY 6 HOURS PRN
Status: DISCONTINUED | OUTPATIENT
Start: 2025-03-20 | End: 2025-03-23 | Stop reason: HOSPADM

## 2025-03-20 RX ORDER — SODIUM CHLORIDE 0.9 % (FLUSH) 0.9 %
5-40 SYRINGE (ML) INJECTION EVERY 12 HOURS SCHEDULED
OUTPATIENT
Start: 2025-03-20

## 2025-03-20 RX ORDER — ONDANSETRON 2 MG/ML
4 INJECTION INTRAMUSCULAR; INTRAVENOUS EVERY 6 HOURS PRN
Status: DISCONTINUED | OUTPATIENT
Start: 2025-03-20 | End: 2025-03-20 | Stop reason: SDUPTHER

## 2025-03-20 RX ORDER — KETOROLAC TROMETHAMINE 30 MG/ML
30 INJECTION, SOLUTION INTRAMUSCULAR; INTRAVENOUS EVERY 6 HOURS
Status: DISCONTINUED | OUTPATIENT
Start: 2025-03-20 | End: 2025-03-20

## 2025-03-20 RX ORDER — MORPHINE SULFATE 1 MG/ML
INJECTION, SOLUTION EPIDURAL; INTRATHECAL; INTRAVENOUS
Status: DISCONTINUED | OUTPATIENT
Start: 2025-03-20 | End: 2025-03-20 | Stop reason: SDUPTHER

## 2025-03-20 RX ORDER — SODIUM CHLORIDE 9 MG/ML
INJECTION, SOLUTION INTRAVENOUS PRN
OUTPATIENT
Start: 2025-03-20

## 2025-03-20 RX ORDER — LIDOCAINE HCL/EPINEPHRINE/PF 2%-1:200K
VIAL (ML) INJECTION
Status: DISCONTINUED | OUTPATIENT
Start: 2025-03-20 | End: 2025-03-20 | Stop reason: SDUPTHER

## 2025-03-20 RX ORDER — LIDOCAINE HCL/EPINEPHRINE/PF 2%-1:200K
VIAL (ML) INJECTION
Status: COMPLETED
Start: 2025-03-20 | End: 2025-03-20

## 2025-03-20 RX ORDER — OXYCODONE HYDROCHLORIDE 5 MG/1
5 TABLET ORAL
Refills: 0 | OUTPATIENT
Start: 2025-03-20

## 2025-03-20 RX ORDER — SODIUM CHLORIDE 0.9 % (FLUSH) 0.9 %
5-40 SYRINGE (ML) INJECTION PRN
Status: DISCONTINUED | OUTPATIENT
Start: 2025-03-20 | End: 2025-03-23 | Stop reason: HOSPADM

## 2025-03-20 RX ORDER — KETOROLAC TROMETHAMINE 30 MG/ML
INJECTION, SOLUTION INTRAMUSCULAR; INTRAVENOUS
Status: DISCONTINUED | OUTPATIENT
Start: 2025-03-20 | End: 2025-03-20 | Stop reason: SDUPTHER

## 2025-03-20 RX ADMIN — PHENYLEPHRINE HYDROCHLORIDE 160 MCG: 50 INJECTION INTRAVENOUS at 02:53

## 2025-03-20 RX ADMIN — SODIUM CHLORIDE, SODIUM LACTATE, POTASSIUM CHLORIDE, AND CALCIUM CHLORIDE: .6; .31; .03; .02 INJECTION, SOLUTION INTRAVENOUS at 01:47

## 2025-03-20 RX ADMIN — ACETAMINOPHEN 975 MG: 325 TABLET ORAL at 02:30

## 2025-03-20 RX ADMIN — ONDANSETRON 4 MG: 2 INJECTION, SOLUTION INTRAMUSCULAR; INTRAVENOUS at 13:06

## 2025-03-20 RX ADMIN — MORPHINE SULFATE 3 MG: 1 INJECTION EPIDURAL; INTRATHECAL; INTRAVENOUS at 03:18

## 2025-03-20 RX ADMIN — ONDANSETRON 4 MG: 2 INJECTION, SOLUTION INTRAMUSCULAR; INTRAVENOUS at 01:32

## 2025-03-20 RX ADMIN — DEXAMETHASONE SODIUM PHOSPHATE 4 MG: 4 INJECTION INTRA-ARTICULAR; INTRALESIONAL; INTRAMUSCULAR; INTRAVENOUS; SOFT TISSUE at 03:33

## 2025-03-20 RX ADMIN — IBUPROFEN 800 MG: 400 TABLET, FILM COATED ORAL at 21:53

## 2025-03-20 RX ADMIN — DOCUSATE SODIUM 100 MG: 100 CAPSULE, LIQUID FILLED ORAL at 09:26

## 2025-03-20 RX ADMIN — KETOROLAC TROMETHAMINE 30 MG: 30 INJECTION, SOLUTION INTRAMUSCULAR at 15:23

## 2025-03-20 RX ADMIN — FAMOTIDINE 20 MG: 10 INJECTION, SOLUTION INTRAVENOUS at 02:34

## 2025-03-20 RX ADMIN — MISOPROSTOL 400 MCG: 200 TABLET ORAL at 03:17

## 2025-03-20 RX ADMIN — PHENYLEPHRINE HYDROCHLORIDE 80 MCG: 50 INJECTION INTRAVENOUS at 03:30

## 2025-03-20 RX ADMIN — PHENYLEPHRINE HYDROCHLORIDE 20 MCG/MIN: 10 INJECTION INTRAVENOUS at 02:49

## 2025-03-20 RX ADMIN — SIMETHICONE 80 MG: 80 TABLET, CHEWABLE ORAL at 23:10

## 2025-03-20 RX ADMIN — TRANEXAMIC ACID 1000 MG: 100 INJECTION, SOLUTION INTRAVENOUS at 03:11

## 2025-03-20 RX ADMIN — ONDANSETRON 4 MG: 2 INJECTION, SOLUTION INTRAMUSCULAR; INTRAVENOUS at 06:37

## 2025-03-20 RX ADMIN — FENTANYL CITRATE 100 MCG: 50 INJECTION INTRAMUSCULAR; INTRAVENOUS at 02:29

## 2025-03-20 RX ADMIN — KETOROLAC TROMETHAMINE 30 MG: 30 INJECTION, SOLUTION INTRAMUSCULAR at 03:38

## 2025-03-20 RX ADMIN — ONDANSETRON 4 MG: 2 INJECTION INTRAMUSCULAR; INTRAVENOUS at 02:59

## 2025-03-20 RX ADMIN — LIDOCAINE HYDROCHLORIDE,EPINEPHRINE BITARTRATE 8 ML: 20; .005 INJECTION, SOLUTION EPIDURAL; INFILTRATION; INTRACAUDAL; PERINEURAL at 02:30

## 2025-03-20 RX ADMIN — SODIUM CHLORIDE, SODIUM LACTATE, POTASSIUM CHLORIDE, AND CALCIUM CHLORIDE: .6; .31; .03; .02 INJECTION, SOLUTION INTRAVENOUS at 15:12

## 2025-03-20 RX ADMIN — Medication 909 MILLI-UNITS/MIN: at 03:08

## 2025-03-20 RX ADMIN — ACETAMINOPHEN 1000 MG: 500 TABLET ORAL at 23:28

## 2025-03-20 RX ADMIN — WATER 2000 MG: 1 INJECTION INTRAMUSCULAR; INTRAVENOUS; SUBCUTANEOUS at 02:37

## 2025-03-20 RX ADMIN — KETOROLAC TROMETHAMINE 30 MG: 30 INJECTION, SOLUTION INTRAMUSCULAR at 09:26

## 2025-03-20 RX ADMIN — DOCUSATE SODIUM 100 MG: 100 CAPSULE, LIQUID FILLED ORAL at 19:19

## 2025-03-20 RX ADMIN — ACETAMINOPHEN 1000 MG: 500 TABLET ORAL at 15:23

## 2025-03-20 RX ADMIN — AZITHROMYCIN MONOHYDRATE 500 MG: 500 INJECTION, POWDER, LYOPHILIZED, FOR SOLUTION INTRAVENOUS at 02:51

## 2025-03-20 RX ADMIN — PHENYLEPHRINE HYDROCHLORIDE 40 MCG: 50 INJECTION INTRAVENOUS at 03:33

## 2025-03-20 NOTE — LACTATION NOTE
This note was copied from a baby's chart.  . Mother states that she did notice breast changes during her pregnancy. Colostrum easily expressed from breasts. Multiple attempts to latch baby, baby very sleepy. Changes a diaper and stimulated baby. Baby still very sleepy. Hand expressed 20 drops of colostrum and fed to baby. Educated mother on feeding cues, feeding on demand, offering both breasts at every feeding, and feeding at least every 3 hours.    Feeding Plan:  Mother will keep baby skin to skin as often as possible, feed on demand, 8-12x/day , respond to feeding cues, obtain latch, listen for audible swallowing, be aware of signs of oxytocin release/ cramping,thirst,sleepiness while breastfeeding, offer both breasts,and will not limit feedings.  Mother agrees to utilize breast massage while nursing to facilitate lactogenesis.

## 2025-03-20 NOTE — DISCHARGE INSTRUCTIONS
Postpartum Support Groups  We know that all of us are dealing with a tremendous amount of uncertainty, confusion and disruption to our daily lives, which may result in increased anxiety, depression and fear. If you are feeling unsettled or worse, please know that we are here to help. During this time of increased caution and care for one another, [facility] [place] ([facility]) is offering virtual support groups to ALL MOTHERS in [place] regardless of the age of your child/children as a way to help weather this emotional storm together. Social support is an important part of self-care during this time of physical distancing.  Virtual postpartum support group meetings available at www.postpartumva.org  Warm Line: 757.148.8511    Breastfeeding Support Groups    and  of each month at [place] from  and  of each month at [place] from 10-11a      https://www.[employer]/"[employer]"-prenatal-education-events         After Your Delivery (the Postpartum Period): Care Instructions  Overview     After childbirth (postpartum period), your body goes through many changes as you recover. In these weeks after delivery, try to take good care of yourself. Get rest whenever you can and accept help from others.  It may take 4 to 6 weeks to feel like yourself again, and possibly longer if you had a  birth. You may feel sore or very tired as you recover. After delivery, you may continue to have contractions as the uterus returns to the size it was before your pregnancy. You will also have some vaginal bleeding. And you may have pain around the vagina as you heal. Several days after delivery you may also have pain and swelling in your breasts as they fill with milk. There are things you can do at home to help ease these discomforts.  After childbirth, it's common to feel emotional. You may feel irritable, cry easily, and feel happy one minute and sad the next. This is called the

## 2025-03-20 NOTE — L&D DELIVERY NOTE
TRIST[facility] [[id number]]      Labor Events     Labor: No   Steroids: None  Cervical Ripening Date/Time:  3/18/25 17:25:00   Cervical Ripening Type: Misoprostol  Rupture Date/Time:  3/20/25 03:07:00   Rupture Type: Intact, AROM  Fluid Color: Meconium  Meconium Consistency: Thick  Fluid Odor: None  Fluid Volume: Moderate  Induction: Cha Bulb (Balloon)  Augmentation: None  Labor Complications: Fetal Intolerance   OB: DELIVERY - COMPLICATIONS    Fetal intolerance to labor, delivered, current hospitalization          Anesthesia    Method: Epidural, Spinal       Delivery Details      Delivery Date: 3/20/25 Delivery Time: 03:07:00   Delivery Type: , Low Transverse  Trial of Labor?: Yes   Categorization: Primary   Priority: unscheduled  Indications for : Fetal Intolerance of Labor       Skin Incision Type: Pfannenstiel  Uterine Incision: Low Transverse        Presentation    Presentation: Vertex       Shoulder Dystocia    Shoulder Dystocia Present?: No       Assisted Delivery Details    Forceps Attempted?: No  Vacuum Extractor Attempted?: No                           Cord    Vessels: 3 Vessels  Complications: None  Delayed Cord Clamping?: Yes  Cord Clamped Date/Time: 3/20/2025 03:09:00  Cord Blood Disposition: Lab  Gases Sent?: No              Placenta    Date/Time: 3/20/2025 03:10:00  Removal: Spontaneous  Appearance: Intact  Disposition: Discarded       Lacerations    Episiotomy: None  Perineal Lacerations: None  Other Lacerations: no non-perineal laceration       Blood Loss  Mother: Ralph Miranda #[id number]     Start of Mother's Information      Delivery Blood Loss   Intrapartum & Postpartum: 25 0241 - 25 0454    Delivery Admission: 25 1556 - 25 0454         Intrapartum & Postpartum Delivery Admission    Quantitative Blood Loss (mL) Hospital Encounter 610 grams 610 grams    Total  610 mL 610 mL               End of Mother's

## 2025-03-20 NOTE — PROGRESS NOTES
Post-Operative  Day 1    Ralph Miranda     Assessment:   Post-Op day 0, stable          Plan:   1. Routine post-operative care      Information for the patient's :  TRISTEN Miranda [[id number]]   , Low Transverse   Patient doing well without significant complaint.   Nausea and vomiting resolved, tolerating po  Flatus yes no: No  F/C out yes no: No  Has ambulated with assistance without diff yes no: No  Adequate pain management  Breast feeding established    Vitals:  /84   Pulse 87   Temp 98.4 °F (36.9 °C) (Oral)   Resp 16   Ht 1.6 m (5' 3\")   Wt 94.3 kg (208 lb)   LMP 2024   SpO2 96%   BMI 36.85 kg/m²   Temp (24hrs), Av.4 °F (36.9 °C), Min:98 °F (36.7 °C), Max:98.7 °F (37.1 °C)      Last 24hr Input/Output:    Intake/Output Summary (Last 24 hours) at 3/20/2025 0935  Last data filed at 3/20/2025 0610  Gross per 24 hour   Intake 1461.42 ml   Output 2935 ml   Net -1473.58 ml          Exam:       A,A&O x3                Patient without distress.     Lungs clear to Auscultation Bilat  Abdomen soft, expected tenderness      Bowel sounds present X 4 Quads  Fundus firm small Lochia Rubra  Wound dressing intact     Lower extremities are negative for swelling, cords or tenderness.    Labs:   Lab Results   Component Value Date/Time    WBC 9.3 2025 04:42 PM    WBC 9.0 2025 10:41 AM    WBC 10.4 2025 04:38 PM    WBC 9.6 2025 09:16 PM    WBC 9.3 2025 05:07 PM    WBC 8.8 2024 09:52 AM    WBC 5.2 2024 11:27 AM    HGB 12.5 2025 04:42 PM    HGB 13.6 2025 10:41 AM    HGB 12.2 2025 04:38 PM    HGB 11.4 2025 09:16 PM    HGB 11.2 2025 05:07 PM    HGB 11.8 2024 09:52 AM    HGB 12.0 2024 11:27 AM    HCT 36.3 2025 04:42 PM    HCT 41.1 2025 10:41 AM    HCT 37.3 2025 04:38 PM    HCT 33.5 2025 09:16 PM    HCT 32.9 2025 05:07 PM    HCT 35.5 2024 09:52 AM    HCT 36.8 2024

## 2025-03-20 NOTE — PROGRESS NOTES
CTSP because of prolonged deceleration. At the time I was called CNM Emre was in another room but in between when I was called and when I got to room she made it to room too.     This is the 3rd prolonged deceleration she's had this evening and like the other 2 it was with a cluster of contractions.This one lasted for 4 minutes and recovered to become category 1 when she got on hands and knees. Baby seems to like hands and knees best. Per nurse cervix is unchanged.    I explained that it's concerning that baby has spontaneous decels although it's reassuring that it recovers and looks well. Can't predict whether it will happen again. We would not feel comfortable starting pitocin right now. She wants to know what is \"safest\" for baby, however it's not a definitive answer.  now under more controlled circumstances is reasonable but does carry the risks of surgery. There could be another decel and we have to do a rush  which increases risk, or it may not happen again.    She and partner took some time to think about it and wanted to know if there were other options. We offered option of breaking her water to see if she gets in a more regular labor pattern and not these clusters of contractions. If this happens after that and theres no change then  recommended. She would like to do that.        ADDENDUM 215AM: There was another prolonged deceleration. Will proceed with . Patient comfortable with that. Anesthesia and nursing notified.

## 2025-03-20 NOTE — PROGRESS NOTES
: Bedside and Verbal shift change report given to SKY Clark RN and ROSENDO Da Silva RN (oncoming nurse) by MIRYAM Joyce RN (offgoing nurse). Report included the following information Nurse Handoff Report.      : RN at bedside. Pt repositioned to left     : Ephedrine given due to FHR.     : Fluid bolus started due to FHR.     : Second dose of ephedrine given due to FHR.     : oxygen applied via face mask due to FHR. Pt repositioned to right side.     : RN at bedside following fetal decel.     : Repositioned patient to left side due to FHR.     : Repositioned pt to hands and knees and started IV fluid bolus due to FHR    : O2 applied via face mask due to FHR. [facility] to bedside. Verbal orders given for terbutaline. Secondary RN confirmed FHR with ultrasound monitor.     : Terbutaline given due to FHR/contraction run.     : Verbal orders from CNM to start pit in 1 hour depending upon FHR and contraction pattern.     230: RN at bedside following fetal deceleration. IV Fluid bolus started due to decreased FHR. O2 applied via facemask due to decreased FHR.     2303: Cha bulb removed with gentle traction. SVE 5. Pt repositioned to knee chest due to FHR.     2304: CNM at bedside. Terbutaline given due to FHR following verbal order from CNM.    2305: MD at bedside. No new orders received.     2315: Per CNM hold induction methods at this time.     0001: RN at bedside. Repositioning pt from knee chest to left side lying.     0039: RN at bedside following FHR deceleration.     0040: IV Fluid bolus started due to FHR. Pt repositioned to hands and knees. CNM notified.    0041: CNM and MD to bedside. Discussing potential need for  section if FHR continues to have prolonged decelerations. CNM offered AROM as an option to see if contractions would get in a better pattern to avoid contraction runs. Patient and spouse would like time to discuss.    0100: Patient would like to

## 2025-03-20 NOTE — PROGRESS NOTES
Labor Progress Note  Patient seen, fetal heart rate and contraction pattern evaluated, patient examined.  BP (!) 112/55   Pulse 100   Temp 98.7 °F (37.1 °C) (Oral)   Resp 18   Ht 1.6 m (5' 3\")   Wt 94.3 kg (208 lb)   LMP 2024   SpO2 100%   BMI 36.85 kg/m²     Physical Exam:  Cervical Exam:  4 cm dilated    80% effaced    -2 station    Membranes:  Spontaneous Rupture of Membranes; Amniotic Fluid: clear fluid  Uterine Activity: spaced contractions followed by runs of contractions every min  Fetal Heart Rate: Baseline: 150 per minute  Variability: moderate  Accelerations: yes  Decelerations: Prolonged    Assessment/Plan:  Sitting at nurses station when fetal heart rate deceleration occurred.   Eliz LOPEZ called.   CE 4-5cm/80/-3  No obvious bag noted, pt feels SROM occurred 5-10 min ago.  Discussed concern of continued prolonged decelerations, getting closer together. Recommending moving forward with c section.   Pt agrees. All questions answered.   Rn team aware, anesthesia called.    Plan to move with  section.     Emre Zamudio, TODD - TARYNM

## 2025-03-20 NOTE — PROGRESS NOTES
0745:Bedside and Verbal shift change report given to Ray RN (oncoming nurse) by Kumar ALLEN (offgoing nurse). Report included the following information Nurse Handoff Report.       1150: Pt complaining of persistent dizziness and intermittent nausea. CNM called, awaiting callback.    1230: Discussed plan of care with Esperanza CHAVEZ and Morro CHAVEZ. To get CBC on pt and give 500 cc bolus if Hgb has dropped below two points from starting Hgb.

## 2025-03-20 NOTE — ANESTHESIA POSTPROCEDURE EVALUATION
Post-Anesthesia Evaluation and Assessment    Patient: Ralph Miranda MRN: [id number]  SSN: [id number]    YOB: 1995  Age: 29 y.o.  Sex: female      I have evaluated the patient and they are stable and ready for discharge from the PACU.     Cardiovascular Function/Vital Signs  Visit Vitals  BP (!) 121/58   Pulse 89   Temp 98 °F (36.7 °C) (Oral)   Resp 16   Ht 1.6 m (5' 3\")   Wt 94.3 kg (208 lb)   SpO2 97%   BMI 36.85 kg/m²       Patient is status post Spinal anesthesia for Procedure(s):   SECTION.    Nausea/Vomiting: None    Postoperative hydration reviewed and adequate.    Pain:  Managed    Neurological Status:   At baseline    Mental Status, Level of Consciousness: Alert and  oriented to person, place, and time    Pulmonary Status:   Adequate oxygenation and airway patent    Complications related to anesthesia: None    Post-anesthesia assessment completed. No concerns    Signed By: Harpal Bender MD     2025

## 2025-03-20 NOTE — OP NOTE
Department of Obstetrics and Gynecology   Section Note    Indications: non-reassuring fetal status    Pre-operative Diagnosis: 40 week 6 day pregnancy.    Post-operative Diagnosis: Living  infant(s) and Male    Surgeon: Silvia Wellington MD     Assistants: Yanira Jenkins RN    Anesthesia: Epidural anesthesia      Procedure Details   The patient was seen in the Labor Room. The risks, benefits, complications, treatment options, and expected outcomes were discussed with the patient.  The patient concurred with the proposed plan, giving informed consent.  The site of surgery properly noted/marked. The patient was taken to Operating Room # 1, identified as Ralph Miranda and the procedure verified as  Delivery. A Time Out was held and the above information confirmed.    After induction of anesthesia, the patient was draped and prepped in the usual sterile manner. A Pfannenstiel incision was made and carried down through the subcutaneous tissue to the fascia. Fascial incision was made and extended transversely. The fascia was  from the underlying rectus tissue superiorly and inferiorly. The peritoneum was identified and entered. Peritoneal incision was extended longitudinally. The utero-vesical peritoneal reflection was incised transversely and the bladder flap was bluntly freed from the lower uterine segment. A low transverse uterine incision was made. Delivered from cephalic presentation was a male infant. Surprisingly there was no nuchal or body cord however there was a lot of thick meconium. The infant was shown to the parents. The umbilical cord was clamped and cut cord blood was obtained for evaluation. The placenta was removed intact and appeared normal. The uterine outline, tubes and ovaries appeared normal. The uterine incision was closed with running locked sutures of 0 Vicryl. There was a small extension of the hysterotomy on the right side. A second imbricating layer was

## 2025-03-20 NOTE — PROGRESS NOTES
Labor Progress Note  Patient seen, fetal heart rate and contraction pattern evaluated, patient examined.  BP (!) 109/57   Pulse (!) 114   Temp 98.2 °F (36.8 °C) (Oral)   Resp 16   Ht 1.6 m (5' 3\")   Wt 94.3 kg (208 lb)   LMP 05/24/2024   SpO2 99%   BMI 36.85 kg/m²     Physical Exam:  Cervical Exam:  5cm by nursing team  Membranes:  Intact  Uterine Activity: Large gaps without contractions, followed by runs of contractions every minute  Fetal Heart Rate: Baseline: 150 per minute  Variability: moderate  Accelerations: yes  Decelerations: Prolonged    Assessment/Plan:  Called to bedside by nursing team at 2302, arrived one min later.  Pt having another prolonged deceleration.   RN team instructed by phone to remove Cook's cath and check pt, will Wellington MD-arrived at 2305.    Upon arriving to bedside, pt in hands and knees, 5cm per RN team, Cook's cath sitting vagina.   O2 on, fluids running.   Second dose of terb given, pt having run of contractions.   Deceleration lasting 9 min. Returned to Cat I with moderate variability, Cat I.   Hold induction methods at this time. Continue to monitor. Reassess in 3-4 hours or sooner prn.   Reviewed case w Eliz Zamudio, TODD - TARYNM

## 2025-03-20 NOTE — PROGRESS NOTES
Labor Progress Note  Patient seen, fetal heart rate and contraction pattern evaluated, patient examined.  BP (!) 93/59   Pulse 93   Temp 98.2 °F (36.8 °C) (Oral)   Resp 16   Ht 1.6 m (5' 3\")   Wt 94.3 kg (208 lb)   LMP 05/24/2024   SpO2 100%   BMI 36.85 kg/m²     Physical Exam:  Cervical Exam:  Deferred  Membranes:  Intact  Uterine Activity: Occ, appear to be coupling lasting 60-90 seconds  Fetal Heart Rate: Baseline: 150 per minute  Variability: moderate  Accelerations: yes  Decelerations: prolonged    Assessment/Plan:  Called to bedside by RN team. Notified of FHR deceleration that has lasted for 4 min.   Upon entering room, pt found in hands and knees with O2 on.   Resting tone assessed after run of cxns-moderately firm.   Terb given.   FHR recovered to Cat I tracing.   Cook's cath remains in place.   Plan to hold pit. May begin pit after     TODD Pozo CNM

## 2025-03-20 NOTE — LACTATION NOTE
This note was copied from a baby's chart.  Assisted mom with latching infant in the football position.Infant noted to have limited tongue extension and is initially uncoordinated. With nipple shield, deep latch noted with rhythmic sucking and occasional swallows noted.    Feeding Plan:  Mother will keep baby skin to skin as often as possible, feed on demand, 8-12x/day , respond to feeding cues, obtain latch, listen for audible swallowing, be aware of signs of oxytocin release/ cramping,thirst,sleepiness while breastfeeding, offer both breasts,and will not limit feedings.  Mother agrees to utilize breast massage while nursing to facilitate lactogenesis.

## 2025-03-21 LAB
BASOPHILS # BLD: 0.02 K/UL (ref 0–0.1)
BASOPHILS NFR BLD: 0.2 % (ref 0–1)
DIFFERENTIAL METHOD BLD: ABNORMAL
EOSINOPHIL # BLD: 0.02 K/UL (ref 0–0.4)
EOSINOPHIL NFR BLD: 0.2 % (ref 0–7)
ERYTHROCYTE [DISTWIDTH] IN BLOOD BY AUTOMATED COUNT: 14.3 % (ref 11.5–14.5)
HCT VFR BLD AUTO: 29.1 % (ref 35–47)
HGB BLD-MCNC: 9.4 G/DL (ref 11.5–16)
IMM GRANULOCYTES # BLD AUTO: 0.08 K/UL (ref 0–0.04)
IMM GRANULOCYTES NFR BLD AUTO: 0.6 % (ref 0–0.5)
LYMPHOCYTES # BLD: 1.26 K/UL (ref 0.8–3.5)
LYMPHOCYTES NFR BLD: 9.7 % (ref 12–49)
MCH RBC QN AUTO: 31.2 PG (ref 26–34)
MCHC RBC AUTO-ENTMCNC: 32.3 G/DL (ref 30–36.5)
MCV RBC AUTO: 96.7 FL (ref 80–99)
MONOCYTES # BLD: 0.79 K/UL (ref 0–1)
MONOCYTES NFR BLD: 6.1 % (ref 5–13)
NEUTS SEG # BLD: 10.77 K/UL (ref 1.8–8)
NEUTS SEG NFR BLD: 83.2 % (ref 32–75)
NRBC # BLD: 0 K/UL (ref 0–0.01)
NRBC BLD-RTO: 0 PER 100 WBC
PLATELET # BLD AUTO: 150 K/UL (ref 150–400)
PMV BLD AUTO: 11.5 FL (ref 8.9–12.9)
RBC # BLD AUTO: 3.01 M/UL (ref 3.8–5.2)
WBC # BLD AUTO: 12.9 K/UL (ref 3.6–11)

## 2025-03-21 PROCEDURE — 1120000000 HC RM PRIVATE OB

## 2025-03-21 PROCEDURE — 6370000000 HC RX 637 (ALT 250 FOR IP): Performed by: OBSTETRICS & GYNECOLOGY

## 2025-03-21 PROCEDURE — 59510 CESAREAN DELIVERY: CPT | Performed by: OBSTETRICS & GYNECOLOGY

## 2025-03-21 PROCEDURE — 85025 COMPLETE CBC W/AUTO DIFF WBC: CPT

## 2025-03-21 RX ADMIN — ACETAMINOPHEN 1000 MG: 500 TABLET ORAL at 17:50

## 2025-03-21 RX ADMIN — ACETAMINOPHEN 1000 MG: 500 TABLET ORAL at 09:53

## 2025-03-21 RX ADMIN — IBUPROFEN 800 MG: 400 TABLET, FILM COATED ORAL at 21:49

## 2025-03-21 RX ADMIN — IBUPROFEN 800 MG: 400 TABLET, FILM COATED ORAL at 14:09

## 2025-03-21 RX ADMIN — DOCUSATE SODIUM 100 MG: 100 CAPSULE, LIQUID FILLED ORAL at 21:49

## 2025-03-21 RX ADMIN — ONDANSETRON 4 MG: 4 TABLET, ORALLY DISINTEGRATING ORAL at 20:28

## 2025-03-21 RX ADMIN — DOCUSATE SODIUM 100 MG: 100 CAPSULE, LIQUID FILLED ORAL at 09:53

## 2025-03-21 RX ADMIN — IBUPROFEN 800 MG: 400 TABLET, FILM COATED ORAL at 06:28

## 2025-03-21 RX ADMIN — OXYCODONE HYDROCHLORIDE 5 MG: 5 TABLET ORAL at 20:28

## 2025-03-21 ASSESSMENT — PAIN SCALES - GENERAL
PAINLEVEL_OUTOF10: 5
PAINLEVEL_OUTOF10: 3

## 2025-03-21 ASSESSMENT — PAIN DESCRIPTION - DESCRIPTORS
DESCRIPTORS: SORE
DESCRIPTORS: ACHING;SORE
DESCRIPTORS: CRAMPING

## 2025-03-21 ASSESSMENT — PAIN DESCRIPTION - ORIENTATION
ORIENTATION: LOWER
ORIENTATION: LOWER
ORIENTATION: RIGHT

## 2025-03-21 ASSESSMENT — PAIN DESCRIPTION - LOCATION
LOCATION: ABDOMEN;INCISION
LOCATION: ABDOMEN
LOCATION: ABDOMEN

## 2025-03-21 NOTE — PROGRESS NOTES
Post-Operative  Day 1    Ralph Miranda     Assessment: Post-Op day 1, stable    False positive RPR: pos RPR 1:2 on admission, but tpal was negative. Baby's RPR also negative, so suspect an immunologic false positive     Plan:   1. Routine post-operative care   2. The risks and benefits of the circumcision  procedure and anesthesia including: bleeding, infection, variability of cosmetic results were discussed at length with the mother. She is aware that future repeat procedures may be necessary. She gives informed consent to proceed as noted and her questions are answered.     Information for the patient's :  TRISTEN Miranda [[id number]]   , Low Transverse     S:  Patient doing well without significant complaint. Nausea and vomiting resolved, tolerating liquids, no flatus, velazquez removed yesterday. Ambulated and voided to the restroom.  Incisional pain notable with movement.     Vitals:  /75   Pulse 94   Temp 98.1 °F (36.7 °C) (Oral)   Resp 16   Ht 1.6 m (5' 3\")   Wt 94.3 kg (208 lb)   LMP 2024   SpO2 97%   Breastfeeding Unknown   BMI 36.85 kg/m²   Temp (24hrs), Av.1 °F (36.7 °C), Min:97.5 °F (36.4 °C), Max:98.7 °F (37.1 °C)      Last 24hr Input/Output:    Intake/Output Summary (Last 24 hours) at 3/21/2025 0915  Last data filed at 3/20/2025 1520  Gross per 24 hour   Intake --   Output 650 ml   Net -650 ml          Exam:        Patient without distress.      Lungs clear.  Abdomen, bowel sounds present, soft, expected tenderness, fundus firm, Wound incision intact - no drainage or erythema     Perineum normal lochia noted               Lower extremities are negative for swelling, cords or tenderness.    Labs:   Lab Results   Component Value Date/Time    WBC 14.4 2025 01:29 PM    WBC 9.3 2025 04:42 PM    WBC 9.0 2025 10:41 AM    WBC 10.4 2025 04:38 PM    WBC 9.6 2025 09:16 PM    WBC 9.3 2025 05:07 PM    WBC 8.8 2024 09:52 AM

## 2025-03-21 NOTE — LACTATION NOTE
This note was copied from a baby's chart.  Baby struggling to latch during the night. Nurse deep suctioned the baby this morning for a large amount of fluid. Mom said baby has not been opening his mouth so she can't get a good latch.     I helped mom with a feeding this morning. Baby noted to have a frenulum under his tongue. He is able to extend his tongue to his gums. I showed mom how to rub her nipple on baby's upper lip to get him to open his mouth. We were able to get him to latch on both breasts in the football hold. He was sucking rhythmically for a short time but was getting tired at the breast. Mom had pump some colostrum this morning that I syringe fed to the baby after the nursing.     Mom will continue to offer the breast at least every 3 hours. She will not limit the time the baby is at the breast and will offer both breasts at each feeding. She may need to pump if baby does not nurse well so she can supplement with her own breast milk.

## 2025-03-21 NOTE — CARE COORDINATION
Care Management Initial Assessment       RUR: 6%--low  Readmission? Yes - [facility] 2/22-2/23  1st IM letter given? No  1st  letter given: No    Chart review completed. Patient readmitted for induction of labor. Recent admission for observation due to abdominal pain during pregnancy. No CM needs noted.      03/21/25 1008   Service Assessment   Patient Orientation Alert and Oriented   Cognition Alert   History Provided By Medical Record   Primary Caregiver Self   Support Systems Spouse/Significant Other   PCP Verified by CM No   Prior Functional Level Independent in ADLs/IADLs   Current Functional Level Independent in ADLs/IADLs   Can patient return to prior living arrangement Yes   Ability to make needs known: Good   Family able to assist with home care needs: Yes   Financial Resources Medicaid   Social/Functional History   Lives With Spouse   Type of Home Apartment   Prior Level of Assist for ADLs Independent   Prior Level of Assist for Homemaking Independent   Ambulation Assistance Independent   Prior Level of Assist for Transfers Independent   Discharge Planning   Type of Residence Apartment   Living Arrangements Spouse/Significant Other   Current Services Prior To Admission None   Potential Assistance Needed N/A   DME Ordered? No   Potential Assistance Purchasing Medications No   Type of Home Care Services None   Patient expects to be discharged to: Apartment   Services At/After Discharge   Transition of Care Consult (CM Consult) N/A   Services At/After Discharge None   Mode of Transport at Discharge Other (see comment)  (in car w/spouse)   Confirm Follow Up Transport Self   Condition of Participation: Discharge Planning   The Plan for Transition of Care is related to the following treatment goals: home     Milla Dallas LMSW  Care Management [facility]  [phone]

## 2025-03-21 NOTE — CARE COORDINATION
Transition of Care Plan:    RUR: 6%--low  Prior Level of Functioning: independent  Disposition: home  Follow up appointments: OB/GYN  DME needed: N/A  Transportation at discharge: in car w/spouse  Caregiver Contact: Jv Miranda, spouse, [phone]  Discharge Caregiver contacted prior to discharge? N/A  Care Conference needed? no  Barriers to discharge:  none noted     03/21/25 1012   Readmission Assessment   Number of Days since last admission? 8-30 days  ([facility] 2/22-2/23)   Previous Disposition Home with Family   Who is being Interviewed Patient  (medical record)   What was the patient's/caregiver's perception as to why they think they needed to return back to the hospital? Other (Comment)  (induction of labor)   Did you visit your Primary Care Physician after you left the hospital, before you returned this time? No   Why weren't you able to visit your PCP? Did not have an appointment   Did you see a specialist, such as Cardiac, Pulmonary, Orthopedic Physician, etc. after you left the hospital? Yes   Who advised the patient to return to the hospital? Physician   Does the patient report anything that got in the way of taking their medications? No   In our efforts to provide the best possible care to you and others like you, can you think of anything that we could have done to help you after you left the hospital the first time, so that you might not have needed to return so soon? Other (Comment)  (induction of labor)     Milla Dallas TANESHA  Care Management [facility]  [phone]

## 2025-03-22 PROCEDURE — 1120000000 HC RM PRIVATE OB

## 2025-03-22 PROCEDURE — 6370000000 HC RX 637 (ALT 250 FOR IP): Performed by: ADVANCED PRACTICE MIDWIFE

## 2025-03-22 PROCEDURE — 6370000000 HC RX 637 (ALT 250 FOR IP): Performed by: OBSTETRICS & GYNECOLOGY

## 2025-03-22 RX ADMIN — IBUPROFEN 800 MG: 400 TABLET, FILM COATED ORAL at 15:58

## 2025-03-22 RX ADMIN — SIMETHICONE 80 MG: 80 TABLET, CHEWABLE ORAL at 12:53

## 2025-03-22 RX ADMIN — OXYCODONE HYDROCHLORIDE 5 MG: 5 TABLET ORAL at 12:37

## 2025-03-22 RX ADMIN — OXYCODONE HYDROCHLORIDE 10 MG: 5 TABLET ORAL at 17:54

## 2025-03-22 RX ADMIN — OXYCODONE HYDROCHLORIDE 5 MG: 5 TABLET ORAL at 06:30

## 2025-03-22 RX ADMIN — OXYCODONE HYDROCHLORIDE 5 MG: 5 TABLET ORAL at 02:25

## 2025-03-22 RX ADMIN — IBUPROFEN 800 MG: 400 TABLET, FILM COATED ORAL at 06:31

## 2025-03-22 RX ADMIN — OXYCODONE HYDROCHLORIDE 10 MG: 5 TABLET ORAL at 22:37

## 2025-03-22 RX ADMIN — ACETAMINOPHEN 1000 MG: 500 TABLET ORAL at 20:38

## 2025-03-22 RX ADMIN — ACETAMINOPHEN 1000 MG: 500 TABLET ORAL at 11:47

## 2025-03-22 RX ADMIN — DOCUSATE SODIUM 100 MG: 100 CAPSULE, LIQUID FILLED ORAL at 11:47

## 2025-03-22 RX ADMIN — ACETAMINOPHEN 1000 MG: 500 TABLET ORAL at 02:24

## 2025-03-22 ASSESSMENT — PAIN DESCRIPTION - DESCRIPTORS
DESCRIPTORS: SORE
DESCRIPTORS: DISCOMFORT
DESCRIPTORS: CRAMPING;DISCOMFORT
DESCRIPTORS: SORE
DESCRIPTORS: SORE

## 2025-03-22 ASSESSMENT — PAIN DESCRIPTION - LOCATION
LOCATION: ABDOMEN;BACK
LOCATION: ABDOMEN;INCISION
LOCATION: INCISION;BACK
LOCATION: ABDOMEN;INCISION

## 2025-03-22 ASSESSMENT — PAIN SCALES - GENERAL
PAINLEVEL_OUTOF10: 5
PAINLEVEL_OUTOF10: 6
PAINLEVEL_OUTOF10: 6
PAINLEVEL_OUTOF10: 9
PAINLEVEL_OUTOF10: 9
PAINLEVEL_OUTOF10: 7
PAINLEVEL_OUTOF10: 6
PAINLEVEL_OUTOF10: 7

## 2025-03-22 ASSESSMENT — PAIN - FUNCTIONAL ASSESSMENT
PAIN_FUNCTIONAL_ASSESSMENT: ACTIVITIES ARE NOT PREVENTED

## 2025-03-22 ASSESSMENT — PAIN DESCRIPTION - ORIENTATION
ORIENTATION: LOWER
ORIENTATION: LOWER;RIGHT
ORIENTATION: LOWER
ORIENTATION: LOWER

## 2025-03-22 NOTE — PROGRESS NOTES
0740: Bedside and Verbal shift change report given to odin Crawford rn (oncoming nurse) by wil Osborne rn (offgoing nurse). Report included the following information Nurse Handoff Report, Intake/Output, and MAR.

## 2025-03-22 NOTE — PROGRESS NOTES
Post-Operative  Day 2    Ralph Miranda     Assessment: Post-Op day 2, doing well  Desires early discharge home yes no: No    Plan:   1. Routine post-operative care    Information for the patient's :  TRISTEN Miranda [[id number]]   , Low Transverse     Patient doing well without significant complaint  Ambulating and voiding without difficulty   Nausea and vomiting resolved  Tolerating PO and PO meds well  Passing flatus  BM yes no: No  Breastfeeding well established      Vitals:  /76   Pulse 81   Temp 98.2 °F (36.8 °C) (Oral)   Resp 18   Ht 1.6 m (5' 3\")   Wt 94.3 kg (208 lb)   LMP 2024   SpO2 97%   Breastfeeding Unknown   BMI 36.85 kg/m²   Temp (24hrs), Av.6 °F (37 °C), Min:98.1 °F (36.7 °C), Max:99.3 °F (37.4 °C)        Exam:    A,A&Ox3      Patient without distress  Breasts soft, NT  Abdomen soft expected tenderness  FF scant Lochia Rubra  Wound incision clean, dry and intact  Lower extremities are negative for swelling, cords or tenderness.    Labs:   Lab Results   Component Value Date/Time    WBC 12.9 2025 12:09 PM    WBC 14.4 2025 01:29 PM    WBC 9.3 2025 04:42 PM    WBC 9.0 2025 10:41 AM    WBC 10.4 2025 04:38 PM    WBC 9.6 2025 09:16 PM    WBC 9.3 2025 05:07 PM    WBC 8.8 2024 09:52 AM    WBC 5.2 2024 11:27 AM    HGB 9.4 2025 12:09 PM    HGB 10.0 2025 01:29 PM    HGB 12.5 2025 04:42 PM    HGB 13.6 2025 10:41 AM    HGB 12.2 2025 04:38 PM    HGB 11.4 2025 09:16 PM    HGB 11.2 2025 05:07 PM    HGB 11.8 2024 09:52 AM    HGB 12.0 2024 11:27 AM    HCT 29.1 2025 12:09 PM    HCT 29.4 2025 01:29 PM    HCT 36.3 2025 04:42 PM    HCT 41.1 2025 10:41 AM    HCT 37.3 2025 04:38 PM    HCT 33.5 2025 09:16 PM    HCT 32.9 2025 05:07 PM    HCT 35.5 2024 09:52 AM    HCT 36.8 2024 11:27 AM     2025 12:09

## 2025-03-22 NOTE — LACTATION NOTE
This note was copied from a baby's chart.  Mom's milk is coming in and she is starting to feel engorged. Educated mom that this is normal and lasts 24-48 hours. Provided tips for management and suggested she feed infant at breast according to infant feeding cues. Assisted mom with positioning infant at breast in the football position, using pillows for support. Deep latch obtained with rhythmic sucking and swallows noted. Mom has also pumped using the hand pump and states she obtained approximately 1 ounce. She syringe fed \"a syringeful\" to infant prior to my visit.

## 2025-03-23 VITALS
HEART RATE: 68 BPM | SYSTOLIC BLOOD PRESSURE: 124 MMHG | HEIGHT: 63 IN | TEMPERATURE: 97.8 F | BODY MASS INDEX: 36.86 KG/M2 | OXYGEN SATURATION: 100 % | DIASTOLIC BLOOD PRESSURE: 68 MMHG | WEIGHT: 208 LBS | RESPIRATION RATE: 16 BRPM

## 2025-03-23 PROCEDURE — 6370000000 HC RX 637 (ALT 250 FOR IP): Performed by: OBSTETRICS & GYNECOLOGY

## 2025-03-23 PROCEDURE — APPNB30 APP NON BILLABLE TIME 0-30 MINS

## 2025-03-23 RX ORDER — OXYCODONE HYDROCHLORIDE 5 MG/1
5 TABLET ORAL EVERY 4 HOURS PRN
Qty: 8 TABLET | Refills: 0 | Status: SHIPPED | OUTPATIENT
Start: 2025-03-23 | End: 2025-03-26

## 2025-03-23 RX ORDER — IBUPROFEN 800 MG/1
800 TABLET, FILM COATED ORAL EVERY 8 HOURS PRN
Qty: 90 TABLET | Refills: 0 | Status: SHIPPED | OUTPATIENT
Start: 2025-03-23

## 2025-03-23 RX ORDER — PSEUDOEPHEDRINE HCL 30 MG
100 TABLET ORAL 2 TIMES DAILY
Qty: 30 CAPSULE | Refills: 0 | Status: SHIPPED | OUTPATIENT
Start: 2025-03-23

## 2025-03-23 RX ORDER — ONDANSETRON 4 MG/1
4 TABLET, ORALLY DISINTEGRATING ORAL 3 TIMES DAILY PRN
Qty: 21 TABLET | Refills: 0 | Status: SHIPPED | OUTPATIENT
Start: 2025-03-23

## 2025-03-23 RX ADMIN — IBUPROFEN 800 MG: 400 TABLET, FILM COATED ORAL at 01:06

## 2025-03-23 RX ADMIN — ONDANSETRON 4 MG: 4 TABLET, ORALLY DISINTEGRATING ORAL at 08:56

## 2025-03-23 RX ADMIN — OXYCODONE HYDROCHLORIDE 10 MG: 5 TABLET ORAL at 04:30

## 2025-03-23 RX ADMIN — ACETAMINOPHEN 1000 MG: 500 TABLET ORAL at 04:24

## 2025-03-23 ASSESSMENT — PAIN DESCRIPTION - LOCATION
LOCATION: ABDOMEN;INCISION
LOCATION: ABDOMEN;INCISION

## 2025-03-23 ASSESSMENT — PAIN SCALES - GENERAL
PAINLEVEL_OUTOF10: 4
PAINLEVEL_OUTOF10: 7

## 2025-03-23 ASSESSMENT — PAIN DESCRIPTION - ORIENTATION
ORIENTATION: LOWER
ORIENTATION: LOWER

## 2025-03-23 ASSESSMENT — PAIN DESCRIPTION - DESCRIPTORS
DESCRIPTORS: SORE
DESCRIPTORS: SORE

## 2025-03-23 NOTE — DISCHARGE SUMMARY
Obstetrical Discharge Summary     Name: Ralph Miranda MRN: [id number]  SSN: [id number]    YOB: 1995  Age: 29 y.o.  Sex: female      Admit Date: 3/18/2025    Discharge Date: 3/23/2025     Admitting Physician: Natasha Aldana MD     Attending Physician:  Natasha Aldana MD     Admission Diagnoses: Encounter for induction of labor [Z34.90]    Discharge Diagnoses:   Information for the patient's :  TRISTEN Miranda [[id number]]   @[id number]@     Additional Diagnoses:  No components found for: \"OBEXTABORH\", \"OBEXTABSCRN\", \"OBEXTRUBELLA\", \"OBEXTGRBS\"    Hospital Course: Normal hospital course following the delivery.    Disposition at Discharge: Home or self care    Discharged Condition: Stable    Patient Instructions:   Current Discharge Medication List        START taking these medications    Details   oxyCODONE (ROXICODONE) 5 MG immediate release tablet Take 1 tablet by mouth every 4 hours as needed for Pain for up to 3 days. Max Daily Amount: 30 mg  Qty: 8 tablet, Refills: 0    Comments: Reduce doses taken as pain becomes manageable  Associated Diagnoses: Postpartum state      ibuprofen (ADVIL;MOTRIN) 800 MG tablet Take 1 tablet by mouth every 8 hours as needed for Pain  Qty: 90 tablet, Refills: 0      docusate sodium (COLACE, DULCOLAX) 100 MG CAPS Take 100 mg by mouth 2 times daily  Qty: 30 capsule, Refills: 0           STOP taking these medications       Ascorbic Acid (VITAMIN C) 1000 MG tablet Comments:   Reason for Stopping:         Prenatal Vit-Fe Fumarate-FA (PRENATAL PO) Comments:   Reason for Stopping:         magnesium gluconate (MAGONATE) 500 MG tablet Comments:   Reason for Stopping:               Reference my discharge instructions.    No follow-ups on file.     Signed By:  TODD Pozo CNM     2025

## 2025-03-23 NOTE — PROGRESS NOTES
Pt c/o nausea in the morning med with alise Zamudio notified,  pt stated she felt  better after a couple of hours and said was ready for D/C

## 2025-03-23 NOTE — PROGRESS NOTES
Post-Operative  Day 3    Ralph Miranda     Assessment: Post-Op day 3, doing well  Desires discharge home yes no: Yes    Plan:   1. Routine post-operative care    Information for the patient's :  TRISTEN Miranda [[id number]]   , Low Transverse     Patient doing well without significant complaint  Ambulating and voiding without difficulty   Nausea and vomiting resolved  Tolerating PO and PO meds well  Passing flatus  BM yes no: Yes  Breastfeeding well established      Vitals:  /76   Pulse 78   Temp 97.9 °F (36.6 °C) (Oral)   Resp 16   Ht 1.6 m (5' 3\")   Wt 94.3 kg (208 lb)   LMP 2024   SpO2 96%   Breastfeeding Unknown   BMI 36.85 kg/m²   Temp (24hrs), Av °F (36.7 °C), Min:97.7 °F (36.5 °C), Max:98.4 °F (36.9 °C)        Exam:    A,A&Ox3      Patient without distress  Breasts soft, NT  Abdomen soft expected tenderness  FF scant Lochia Rubra  Wound incision clean, dry and intact  Lower extremities are negative for swelling, cords or tenderness.    Labs:   Lab Results   Component Value Date/Time    WBC 12.9 2025 12:09 PM    WBC 14.4 2025 01:29 PM    WBC 9.3 2025 04:42 PM    WBC 9.0 2025 10:41 AM    WBC 10.4 2025 04:38 PM    WBC 9.6 2025 09:16 PM    WBC 9.3 2025 05:07 PM    WBC 8.8 2024 09:52 AM    WBC 5.2 2024 11:27 AM    HGB 9.4 2025 12:09 PM    HGB 10.0 2025 01:29 PM    HGB 12.5 2025 04:42 PM    HGB 13.6 2025 10:41 AM    HGB 12.2 2025 04:38 PM    HGB 11.4 2025 09:16 PM    HGB 11.2 2025 05:07 PM    HGB 11.8 2024 09:52 AM    HGB 12.0 2024 11:27 AM    HCT 29.1 2025 12:09 PM    HCT 29.4 2025 01:29 PM    HCT 36.3 2025 04:42 PM    HCT 41.1 2025 10:41 AM    HCT 37.3 2025 04:38 PM    HCT 33.5 2025 09:16 PM    HCT 32.9 2025 05:07 PM    HCT 35.5 2024 09:52 AM    HCT 36.8 2024 11:27 AM     2025 12:09 PM

## 2025-03-31 ENCOUNTER — TELEPHONE (OUTPATIENT)
Age: 30
End: 2025-03-31

## 2025-03-31 ENCOUNTER — POSTPARTUM VISIT (OUTPATIENT)
Age: 30
End: 2025-03-31

## 2025-03-31 VITALS
WEIGHT: 184 LBS | HEIGHT: 63 IN | RESPIRATION RATE: 18 BRPM | SYSTOLIC BLOOD PRESSURE: 105 MMHG | TEMPERATURE: 98.2 F | OXYGEN SATURATION: 96 % | HEART RATE: 81 BPM | DIASTOLIC BLOOD PRESSURE: 71 MMHG | BODY MASS INDEX: 32.6 KG/M2

## 2025-03-31 NOTE — TELEPHONE ENCOUNTER
Called and spoke with pt, she relays that she is having pain and that her incision is opening more. She has been placed on the schedule for 10am

## 2025-03-31 NOTE — PROGRESS NOTES
Ralph Miranda is a 29 y.o. female presents for a problem visit.    Patient being seen for wound check. Patient delivered on 3/20/2025 and has been having some pain. Relays that she also feels like the incision is opening. Contributes this to getting up very quickly to take care of baby.     Chief Complaint   Patient presents with    Wound Check     Patient's last menstrual period was 05/24/2024.  Birth Control: recent pregnant   Last Pap: due    1. Have you been to the ER, urgent care clinic, or hospitalized since your last visit? Yes    2. Have you seen or consulted any other health care providers outside of the [facility] since your last visit? No    Examination chaperoned by       Quin Villalpando LPN.

## 2025-03-31 NOTE — PROGRESS NOTES
Problem Visit    Ralph Miranda is a 29 y.o.  presenting for problem visit.     Her main concern today is incision concerns.     Delivered by unscheduled  on 3/20/25 by Dr. Wellington [facility].      She reports an area of pain and skin disruption on the right lateral side of her incision.      Otherwise, she is doing well, denies much pain. Bleeding is light. Baby is doing well!    Past Medical History:   Diagnosis Date    Mental disorder     anxiety/depression-no meds       Past Surgical History:   Procedure Laterality Date     SECTION N/A 3/20/2025     SECTION performed by Silvia Wellington MD at [facility] OR    WISDOM TOOTH EXTRACTION         Family History   Problem Relation Age of Onset    Diabetes Father     Diabetes Maternal Grandmother     Diabetes Paternal Grandmother     Diabetes Brother        Social History     Socioeconomic History    Marital status:      Spouse name: Not on file    Number of children: Not on file    Years of education: Not on file    Highest education level: Not on file   Occupational History    Not on file   Tobacco Use    Smoking status: Never    Smokeless tobacco: Never   Vaping Use    Vaping status: Never Used   Substance and Sexual Activity    Alcohol use: Not Currently     Alcohol/week: 4.0 standard drinks of alcohol     Types: 4 Cans of beer per week    Drug use: Never    Sexual activity: Yes     Partners: Male   Other Topics Concern    Not on file   Social History Narrative    Not on file     Social Drivers of Health     Financial Resource Strain: Medium Risk (2024)    Overall Financial Resource Strain (CARDIA)     Difficulty of Paying Living Expenses: Somewhat hard   Food Insecurity: Food Insecurity Present (3/18/2025)    Hunger Vital Sign     Worried About Running Out of Food in the Last Year: Often true     Ran Out of Food in the Last Year: Often true   Transportation Needs: No Transportation Needs (3/18/2025)    PRAPARE - Transportation     Lack

## 2025-04-07 ENCOUNTER — POSTPARTUM VISIT (OUTPATIENT)
Age: 30
End: 2025-04-07
Payer: MEDICAID

## 2025-04-07 VITALS
OXYGEN SATURATION: 97 % | HEART RATE: 83 BPM | HEIGHT: 63 IN | BODY MASS INDEX: 32.07 KG/M2 | WEIGHT: 181 LBS | RESPIRATION RATE: 16 BRPM | TEMPERATURE: 98.2 F | SYSTOLIC BLOOD PRESSURE: 102 MMHG | DIASTOLIC BLOOD PRESSURE: 65 MMHG

## 2025-04-07 DIAGNOSIS — T81.49XA WOUND INFECTION AFTER SURGERY: Primary | ICD-10-CM

## 2025-04-07 PROCEDURE — 99214 OFFICE O/P EST MOD 30 MIN: CPT | Performed by: OBSTETRICS & GYNECOLOGY

## 2025-04-07 RX ORDER — CEPHALEXIN 500 MG/1
500 CAPSULE ORAL 4 TIMES DAILY
Qty: 40 CAPSULE | Refills: 0 | Status: SHIPPED | OUTPATIENT
Start: 2025-04-07 | End: 2025-04-17

## 2025-04-07 NOTE — PROGRESS NOTES
Chief Complaint   Patient presents with    Postpartum Care           Vitals:    04/07/25 1454   BP: 102/65   Pulse:    Resp:    Temp:    SpO2:             1. Have you been to the ER, urgent care clinic since your last visit?  Hospitalized since your last visit?  No  2. Have you seen or consulted any other health care providers outside of the [facility] since your last visit?  Include any pap smears or colon screening. No      
hives  Neuro: negative for dizziness, headache, confusion, weakness  Psych: negative for anxiety, depression, change in mood  Heme/lymph: negative for bleeding, bruising, pallor    Physical Exam    /65 (BP Site: Right Upper Arm, Patient Position: Sitting, BP Cuff Size: Large Adult)   Pulse 83   Temp 98.2 °F (36.8 °C) (Temporal)   Resp 16   Ht 1.6 m (5' 3\")   Wt 82.1 kg (181 lb)   LMP 05/24/2024   SpO2 97%   Breastfeeding Yes   BMI 32.06 kg/m²       OBGyn Exam      Constitutional  Appearance: well-nourished, well developed, alert, in no acute distress    HENT  Head and Face: appears normal    Neck  Inspection/Palpation: normal appearance, no masses or tenderness  Thyroid: gland size normal, nontender    Chest  Respiratory Effort: non-labored breathing    Cardiovascular  Extremities: no peripheral edema    Gastrointestinal  Abdominal Examination: abdomen non-distended, right lateral aspect of the incision with a 1.5 cm deep defect in the epithelium which probes to about 1 cm depth.  A small amount of malodorous yellow discharge is present but no drainage or discharge is expressible.  No surrounding skin changes no surrounding edema  Liver and spleen: no hepatomegaly present, spleen not palpable  Hernias: no hernias identified    Skin  General Inspection: no rash, no lesions identified    Neurologic/Psychiatric  Mental Status:  Orientation: grossly oriented to person, place and time  Mood and Affect: mood normal, affect appropriate      Assessment/Plan:    1. Wound infection after surgery  We discussed her exam findings and I suspect a mild superficial wound infection given the small amount of purulent malodorous drainage.  The Dermabond from the incision was removed. reassurance provided that her wound did not tract there is no surrounding skin changes and the remainder of her incision appears well-approximated and normal.  A wound culture was collected.  A prescription for Keflex was sent.  Return in 1

## 2025-04-10 LAB
BACTERIA SPEC CULT: NORMAL
GRAM STN SPEC: NORMAL
GRAM STN SPEC: NORMAL
SERVICE CMNT-IMP: NORMAL

## 2025-04-11 ENCOUNTER — RESULTS FOLLOW-UP (OUTPATIENT)
Age: 30
End: 2025-04-11

## 2025-04-16 ENCOUNTER — POSTPARTUM VISIT (OUTPATIENT)
Age: 30
End: 2025-04-16
Payer: COMMERCIAL

## 2025-04-16 VITALS
WEIGHT: 185.6 LBS | BODY MASS INDEX: 32.89 KG/M2 | DIASTOLIC BLOOD PRESSURE: 82 MMHG | TEMPERATURE: 98 F | OXYGEN SATURATION: 97 % | SYSTOLIC BLOOD PRESSURE: 124 MMHG | HEART RATE: 75 BPM | RESPIRATION RATE: 16 BRPM | HEIGHT: 63 IN

## 2025-04-16 DIAGNOSIS — B37.2 YEAST INFECTION OF THE SKIN: ICD-10-CM

## 2025-04-16 DIAGNOSIS — T81.49XA WOUND INFECTION AFTER SURGERY: Primary | ICD-10-CM

## 2025-04-16 PROCEDURE — 99213 OFFICE O/P EST LOW 20 MIN: CPT | Performed by: OBSTETRICS & GYNECOLOGY

## 2025-04-16 RX ORDER — NYSTATIN AND TRIAMCINOLONE ACETONIDE 100000; 1 [USP'U]/G; MG/G
CREAM TOPICAL
Qty: 30 G | Refills: 0 | Status: SHIPPED | OUTPATIENT
Start: 2025-04-16

## 2025-04-16 NOTE — PROGRESS NOTES
Problem Visit    Ralph Miranda is a 29 y.o.  presenting for problem visit.     Her main concern today is incisional infection follow-up. A small superficial skin dehiscence and wound infection was diagnosed last week. She is nearly completed withher course of keflex.     She reports improved pain on the right lateral aspect of her incision. Odor has improved, and the drainage is very scant.  Pumping is still going well.       Past Medical History:   Diagnosis Date    Mental disorder     anxiety/depression-no meds       Past Surgical History:   Procedure Laterality Date     SECTION N/A 3/20/2025     SECTION performed by Silvia Wellington MD at [facility] L&D OR    WISDOM TOOTH EXTRACTION         Family History   Problem Relation Age of Onset    Diabetes Father     Diabetes Maternal Grandmother     Diabetes Paternal Grandmother     Diabetes Brother        Social History     Socioeconomic History    Marital status:      Spouse name: Not on file    Number of children: Not on file    Years of education: Not on file    Highest education level: Not on file   Occupational History    Not on file   Tobacco Use    Smoking status: Never    Smokeless tobacco: Never   Vaping Use    Vaping status: Never Used   Substance and Sexual Activity    Alcohol use: Not Currently     Alcohol/week: 4.0 standard drinks of alcohol     Types: 4 Cans of beer per week    Drug use: Never    Sexual activity: Yes     Partners: Male   Other Topics Concern    Not on file   Social History Narrative    Not on file     Social Drivers of Health     Financial Resource Strain: Medium Risk (2024)    Overall Financial Resource Strain (CARDIA)     Difficulty of Paying Living Expenses: Somewhat hard   Food Insecurity: Food Insecurity Present (3/18/2025)    Hunger Vital Sign     Worried About Running Out of Food in the Last Year: Often true     Ran Out of Food in the Last Year: Often true   Transportation Needs: No Transportation Needs

## 2025-04-16 NOTE — PROGRESS NOTES
Ralph Miranda is a 29 y.o. female presents for a problem visit.    Chief Complaint   Patient presents with    Postpartum Care     Patient's last menstrual period was 05/24/2024.      The patient is reporting having: incision evaluation      1. Have you been to the ER, urgent care clinic, or hospitalized since your last visit? No    2. Have you seen or consulted any other health care providers outside of the [facility] since your last visit? No

## 2025-05-14 ENCOUNTER — POSTPARTUM VISIT (OUTPATIENT)
Age: 30
End: 2025-05-14

## 2025-05-14 VITALS
WEIGHT: 187 LBS | HEART RATE: 87 BPM | BODY MASS INDEX: 33.13 KG/M2 | TEMPERATURE: 97.8 F | OXYGEN SATURATION: 98 % | SYSTOLIC BLOOD PRESSURE: 118 MMHG | DIASTOLIC BLOOD PRESSURE: 76 MMHG | HEIGHT: 63 IN | RESPIRATION RATE: 12 BRPM

## 2025-05-14 PROBLEM — Z34.90 PREGNANCY: Status: RESOLVED | Noted: 2024-07-22 | Resolved: 2025-05-14

## 2025-05-14 PROCEDURE — 0503F POSTPARTUM CARE VISIT: CPT | Performed by: OBSTETRICS & GYNECOLOGY

## 2025-05-14 NOTE — PROGRESS NOTES
Postpartum Visit    Ralph Miranda is a 29 y.o.  presenting for her postpartum visit.      She delivered via [facility] 3/20/25 and delivery was uncomplicated.  She has been doing well since discharged from the hospital with a normal postpartum course.    Baby boy is doing well!    Bleeding - resolved  Perineal/Incisional pain - improved  Mood - good  Feeding - breast (pumping) and formula  Contraception - declines        Past Medical History:   Diagnosis Date    Mental disorder     anxiety/depression-no meds       Past Surgical History:   Procedure Laterality Date     SECTION N/A 3/20/2025     SECTION performed by Silvia Wellington MD at [facility]    WISDOM TOOTH EXTRACTION         Family History   Problem Relation Age of Onset    Diabetes Father     Diabetes Maternal Grandmother     Diabetes Paternal Grandmother     Diabetes Brother        Social History     Socioeconomic History    Marital status:      Spouse name: Not on file    Number of children: Not on file    Years of education: Not on file    Highest education level: Not on file   Occupational History    Not on file   Tobacco Use    Smoking status: Never    Smokeless tobacco: Never   Vaping Use    Vaping status: Never Used   Substance and Sexual Activity    Alcohol use: Not Currently     Alcohol/week: 4.0 standard drinks of alcohol     Types: 4 Cans of beer per week    Drug use: Never    Sexual activity: Yes     Partners: Male   Other Topics Concern    Not on file   Social History Narrative    Not on file     Social Drivers of Health     Financial Resource Strain: Medium Risk (2024)    Overall Financial Resource Strain (CARDIA)     Difficulty of Paying Living Expenses: Somewhat hard   Food Insecurity: Food Insecurity Present (3/18/2025)    Hunger Vital Sign     Worried About Running Out of Food in the Last Year: Often true     Ran Out of Food in the Last Year: Often true   Transportation Needs: No Transportation Needs (3/18/2025)

## 2025-05-14 NOTE — PROGRESS NOTES
Ralph Miranda is a 29 y.o. female returns for a routine post-partum follow-up visit. Patient reports great improvement with her incision.    Chief Complaint   Patient presents with    Postpartum Care       Postpartum Depression: Not on file         Type of delivery: primary  section  Date of Delivery: 2025  Breastfeeding: yes  Bleeding Resolved: yes  Birth Control: none.  Last Pap: normal obtained 3 year(s) ago.        Problems: problems - incision    1. Have you been to the ER, urgent care clinic, or hospitalized since your last visit? No    2. Have you seen or consulted any other health care providers outside of the [facility] since your last visit? No    Examination chaperoned by BALDEV LICEA MA.

## 2025-08-28 ENCOUNTER — OFFICE VISIT (OUTPATIENT)
Age: 30
End: 2025-08-28
Payer: COMMERCIAL

## 2025-08-28 VITALS
HEART RATE: 80 BPM | OXYGEN SATURATION: 98 % | DIASTOLIC BLOOD PRESSURE: 71 MMHG | TEMPERATURE: 98.5 F | BODY MASS INDEX: 34.2 KG/M2 | RESPIRATION RATE: 16 BRPM | WEIGHT: 193 LBS | HEIGHT: 63 IN | SYSTOLIC BLOOD PRESSURE: 107 MMHG

## 2025-08-28 DIAGNOSIS — Z12.4 SCREENING FOR CERVICAL CANCER: Primary | ICD-10-CM

## 2025-08-28 DIAGNOSIS — Z01.419 ENCOUNTER FOR WELL WOMAN EXAM: ICD-10-CM

## 2025-08-28 PROCEDURE — 99395 PREV VISIT EST AGE 18-39: CPT | Performed by: ADVANCED PRACTICE MIDWIFE

## 2025-08-28 ASSESSMENT — LIFESTYLE VARIABLES: HOW MANY STANDARD DRINKS CONTAINING ALCOHOL DO YOU HAVE ON A TYPICAL DAY: PATIENT DOES NOT DRINK

## 2025-09-03 LAB
., LABCORP: NORMAL
CYTOLOGIST CVX/VAG CYTO: NORMAL
CYTOLOGY CVX/VAG DOC CYTO: NORMAL
CYTOLOGY CVX/VAG DOC THIN PREP: NORMAL
DX ICD CODE: NORMAL
OTHER STN SPEC: NORMAL
SERVICE CMNT-IMP: NORMAL
STAT OF ADQ CVX/VAG CYTO-IMP: NORMAL

## (undated) DEVICE — STERILE POLYISOPRENE POWDER-FREE SURGICAL GLOVES: Brand: PROTEXIS

## (undated) DEVICE — DRESSING SIL W4XL5IN ANTIBACT GELLING FBR CYTOFORM

## (undated) DEVICE — APPLICATOR MEDICATED 26 CC SOLUTION HI LT ORNG CHLORAPREP

## (undated) DEVICE — DEVON™ KNEE AND BODY STRAP 60" X 3" (1.5 M X 7.6 CM): Brand: DEVON

## (undated) DEVICE — CATHETER URIN 16FR 30CC BLLN 2 W F LUBRI-SIL IC

## (undated) DEVICE — DRAPE FLD WRM W44XL66IN C6L FOR INTRATEMP SYS THERMABASIN

## (undated) DEVICE — LIGHT HANDLE: Brand: DEVON

## (undated) DEVICE — ROYALSILK SURGICAL GOWN, L: Brand: CONVERTORS

## (undated) DEVICE — 3000CC GUARDIAN II: Brand: GUARDIAN

## (undated) DEVICE — ELECTRODE PT RET AD L9FT HI MOIST COND ADH HYDRGEL CORDED

## (undated) DEVICE — SOLUTION IRRIG 1000ML 0.9% SOD CHL USP POUR PLAS BTL

## (undated) DEVICE — STAPLER SKIN SQ 30 ABSRB STPL DISP INSORB ORDER VIA PHONE OR EMAIL

## (undated) DEVICE — ATTACHMENT SMK 3/8INX10FT VALLEYLAB

## (undated) DEVICE — SUTURE VICRYL SZ 0 L36IN ABSRB UD L40MM CT 1/2 CIR TAPERPOINT J958H

## (undated) DEVICE — SOLIDIFIER FLUID 1.3 OZ GEL 1200CC NS

## (undated) DEVICE — SOLUTION IRRIG 1000ML STRL H2O USP PLAS POUR BTL

## (undated) DEVICE — COVERALLS PROTCT 2XL WHT SMS ANTISTATIC PREM KNIT CUF FULL

## (undated) DEVICE — MEDI-VAC NON-CONDUCTIVE SUCTION TUBING: Brand: CARDINAL HEALTH

## (undated) DEVICE — KENDALL SCD EXPRESS SLEEVES, KNEE LENGTH, MEDIUM: Brand: KENDALL SCD

## (undated) DEVICE — Z DISCONTINUED PACK PROCEDURE SURG C SECT KT SMH